# Patient Record
Sex: MALE | Race: WHITE | NOT HISPANIC OR LATINO | Employment: FULL TIME | URBAN - METROPOLITAN AREA
[De-identification: names, ages, dates, MRNs, and addresses within clinical notes are randomized per-mention and may not be internally consistent; named-entity substitution may affect disease eponyms.]

---

## 2017-04-05 ENCOUNTER — OFFICE VISIT (OUTPATIENT)
Dept: SURGERY | Facility: CLINIC | Age: 52
End: 2017-04-05

## 2017-04-05 VITALS — HEIGHT: 72 IN | HEART RATE: 87 BPM | BODY MASS INDEX: 38.6 KG/M2 | OXYGEN SATURATION: 98 % | WEIGHT: 285 LBS

## 2017-04-05 DIAGNOSIS — R10.32 GROIN PAIN, LEFT: Primary | ICD-10-CM

## 2017-04-05 PROCEDURE — 99203 OFFICE O/P NEW LOW 30 MIN: CPT | Performed by: SURGERY

## 2017-04-05 NOTE — PROGRESS NOTES
Date: 2017   Patient Name: Zohaib Duran   Medical Record #: [id number]   : 1965  Age: 51 y.o.  Sex: male      Referring MD:  No referring provider defined for this encounter.    Reason for Visit  Chief Complaint   Patient presents with   • Inguinal Hernia     Left         History of Present Illness:     Zohaib Duran is a 51 y.o. male who presents with left groin pain.  The patient complains of left groin pain that started approximately 1 week ago. Cannot recognize a initiating factor. This was all of the sudden has been constant since then with periods of exacerbation and improvement.  The pain can be dull, sharp, burning and does not have specific quality.  The intensity is mild to moderate and he has been able to do same activities.  He was doing before without taking any pain medication.  The pain is located over the left groin and radiates to the medial aspect of the left thigh and the left testicle.  Physical activity tends to make the pain worse and laying in a recliner makes pain better.  He denies feeling any masses or lumps in the left groin.  He denies pain on the right side.  He had prior open left inguinal hernia repair with mesh by Dr. Bloom in .  He denies any nausea and vomiting, there is no obstructive symptoms.     Medical History  There is no problem list on file for this patient.      Past Surgical History  Past Surgical History:   Procedure Laterality Date   • COLONOSCOPY      hemorrhoids   • HERNIA REPAIR Left 10/27/2010    Open left inguinal hernia repair w/ mesh-Dr. Fito Bloom   • TUMOR REMOVAL Left     thigh       Allergies  Allergies   Allergen Reactions   • Sulfa Antibiotics Rash and Swelling       Medications  No current outpatient prescriptions on file.     No current facility-administered medications for this visit.        Social History  Social History     Social History   • Marital status:      Spouse name: N/A   • Number of children: N/A  "  • Years of education: N/A     Social History Main Topics   • Smoking status: Former Smoker   • Smokeless tobacco: None   • Alcohol use Yes      Comment: occasionally   • Drug use: No   • Sexual activity: Yes     Partners: Female     Birth control/ protection: Surgical     Other Topics Concern   • None     Social History Narrative       Family History  Family History   Problem Relation Age of Onset   • Glaucoma Mother    • Kidney disease Other      STONES       REVIEW OF SYSTEMS  CONSTITUTIONAL: Denies fevers, chills, unintentional weight loss or weight gain  RESPIRATORY: Denies chronic cough or sob.   CARDIAC: Denies chest pain, palpitations, edema  GI: Denies dyspepsia, reflux, heartburn, nausea, vomiting, diarrhea or constipation  : Denies dysuria or hematuria.    MUSCULOSKELETAL: Denies muscle weakness and pain   NEURO: Denies chronic headaches.   ENDOCRINE: Denies significant heat or cold intolerance or history of thyroid problems.    DERM: no rashes,lesions or discharge.     Physical Exam  Pulse 87  Ht 72\" (182.9 cm)  Wt 285 lb (129 kg)  SpO2 98%  BMI 38.65 kg/m2  Body mass index is 38.65 kg/(m^2).  General: Alert and oriented, NAD  HEENT: EOMI, conjunctiva clear, moist mucus membranes  Lungs: clear to auscultation and percussion, no chest deformities noted.  Cardiovascular:  Regular rate and rhythm  Extremities: Without clubbing cyanosis or edema  Musculoskeletal: Without acute abnormality, good ROM.   Skin:  No rashes. Warm and moist.  Neuro: Gait normal. Sensation and strength grossly normal.  Abdominal exam: Abdomen is soft and nontender, is nondistended, bowel sounds are positive.  There is no evidence of right inguinal hernia.  The inguinal floor is strong.  Over the left inguinal canal there is tenderness to palpation.  I do not feel an inguinal hernia.  He has weakness of the inguinal canal but no evidence of herniation.     Medical Decision Making  Test Results:    Results for orders placed or " performed during the hospital encounter of 12/04/16   POCT rapid strep A   Result Value Ref Range    Rapid Strep A Screen Positive (A) Negative, VALID, INVALID, Not Performed    Internal Control Passed Passed    Lot Number [id number]     Expiration Date 1/18        Impression:  This is a 51 y.o. male patient with a chief complaint of left groin pain that started a week ago.  On physical exam I do not feel an inguinal hernia but some weakness of the left inguinal floor.  His physical exam is limited by his body habitus.  I discussed with him about my findings.  It doesn't seem to me that he has a recurrence of his inguinal hernia but he has been having constant pain the need to be follow-up. He does not have any obstructive symptoms or concerning signs.      Plan:  -  I recommend that he does limited physical activity for the next 2 weeks   - Take ibuprofen and Tylenol daily for pain   - If they persist or doesn't improve within the next 2 weeks I will order a CT scan of the groin to rule out small inguinal hernia recurrence or any other causes of groin pain.   - Emergency signs like severe groin pain that doesn't improve, vomiting, abdominal distention fevers were discussed with him and he should come to emergency room or call our office if this happened .    - The patient verbalized understanding and agreed with the plan     Mulugeta Ochoa MD  General, Minimally Invasive and Endoscopic Surgery  [facility]    [facility], [address]  [place], [place], [address]  P: [phone]  F: [phone]

## 2017-06-20 ENCOUNTER — OFFICE VISIT (OUTPATIENT)
Dept: FAMILY MEDICINE CLINIC | Facility: CLINIC | Age: 52
End: 2017-06-20

## 2017-06-20 VITALS
BODY MASS INDEX: 36.06 KG/M2 | OXYGEN SATURATION: 97 % | HEART RATE: 100 BPM | TEMPERATURE: 98.7 F | DIASTOLIC BLOOD PRESSURE: 64 MMHG | SYSTOLIC BLOOD PRESSURE: 100 MMHG | HEIGHT: 72 IN | WEIGHT: 266.2 LBS

## 2017-06-20 DIAGNOSIS — L03.119 RECURRENT CELLULITIS OF LOWER EXTREMITY: Primary | ICD-10-CM

## 2017-06-20 PROBLEM — R53.83 FATIGUE: Status: ACTIVE | Noted: 2017-06-20

## 2017-06-20 PROBLEM — G47.33 OBSTRUCTIVE SLEEP APNEA: Status: ACTIVE | Noted: 2017-06-20

## 2017-06-20 PROBLEM — M54.16 LUMBAR RADICULOPATHY: Status: ACTIVE | Noted: 2017-06-20

## 2017-06-20 PROBLEM — M70.70 BURSITIS OF HIP: Status: ACTIVE | Noted: 2017-06-20

## 2017-06-20 PROBLEM — E66.01 MORBID OBESITY (HCC): Status: ACTIVE | Noted: 2017-06-20

## 2017-06-20 PROCEDURE — 99213 OFFICE O/P EST LOW 20 MIN: CPT | Performed by: PHYSICIAN ASSISTANT

## 2017-06-20 RX ORDER — CLINDAMYCIN HYDROCHLORIDE 300 MG/1
CAPSULE ORAL
Refills: 0 | COMMUNITY
Start: 2017-06-11 | End: 2017-07-17

## 2017-06-20 RX ORDER — DOXYCYCLINE 100 MG/1
100 TABLET ORAL 2 TIMES DAILY
Qty: 20 TABLET | Refills: 0 | Status: SHIPPED | OUTPATIENT
Start: 2017-06-20 | End: 2017-07-17

## 2017-06-20 NOTE — PROGRESS NOTES
Subjective   Zohaib Duran is a 51 y.o. male here today follow-up [facility] ER for cellulitis left lower leg on June 11    History of Present Illness     DID HAVE SLEEP STUDY- HAS CPAP.   DID NOT HAVE COLONOSCOPY. NO FHX COLONOSCOPY. HAD COLONOSCOPY YEARS AGO FOR RECTAL BLEEDING- ADVISED WAS FROM HEMORRHOIDS.     HAS HAD CELLULITIS 3-4 X. HAD SAME TIME LAST YEAR AND WENT TO ER. HAD A COUPLE TIMES PRIOR.   PATIENT HAS MULTIPLE JOINTS ACHES AND PAINS- HAD INJECTION WITH ORTHOPEDIST, HOWEVER, WAS PAINFUL AND DIDN'T HELP FOR LONG.     NO OTHER SIGNIFICANT MEDICAL ISSUES.   NO VASCULAR TESTING IN THE PAST.     The following portions of the patient's history were reviewed and updated as appropriate: allergies, current medications, past family history, past medical history, past social history, past surgical history and problem list.    Review of Systems   Skin:        Cellulitis left lower leg       Objective   Physical Exam   Constitutional: He is oriented to person, place, and time. He appears well-developed.   HENT:   Head: Normocephalic and atraumatic.   Right Ear: External ear normal.   Left Ear: External ear normal.   Eyes: Conjunctivae are normal.   Neck: Carotid bruit is not present. No tracheal deviation present. No thyroid mass and no thyromegaly present.   Cardiovascular: Normal rate, regular rhythm and normal heart sounds.    Pulses:       Radial pulses are 2+ on the right side, and 2+ on the left side.        Dorsalis pedis pulses are 2+ on the right side, and 2+ on the left side.        Posterior tibial pulses are 2+ on the right side, and 1+ on the left side.   Pulmonary/Chest: Effort normal and breath sounds normal.   Neurological: He is alert and oriented to person, place, and time. Gait normal.   Skin: Skin is warm and dry.   Psychiatric: He has a normal mood and affect. His behavior is normal. Judgment and thought content normal.   Nursing note and vitals reviewed.      Assessment/Plan   Zohaib was seen  today for follow [facility] from june 11 for cellulitis left lower leg.    Diagnoses and all orders for this visit:    Recurrent cellulitis of lower extremity  -     Doppler Ankle Brachial Index Single Level CAR  -     Duplex Venous Lower Extremity - Left CAR  -     doxycycline (ADOXA) 100 MG tablet; Take 1 tablet by mouth 2 (Two) Times a Day.      Patient Instructions   51 YEAR OLD MALE WHO PRESENTS TODAY IN FOLLOW UP OF CELLULITIS. PATIENT HAS HAD MULTIPLE EPISODES OF CELLULITIS. THERE HAVE BEEN NO EVENTS OR INJURIES LEADING TO CELLULITIS. PATIENT HAS HAD IMPROVEMENT BUT NO RESOLUTION OF SYMPTOMS. PATIENT TO TAKE DOXYCYCLINE 100 MG TWICE DAILY FOR 14 DAYS. TO BE SEEN ASAP IF WORSENING OR NO RESOLUTION. I WILL ALSO REFER FOR VENOUS DUPLEX AND WESLEY. PATIENT TO RETURN TO OFFICE FOR CPE.

## 2017-06-27 NOTE — PATIENT INSTRUCTIONS
51 YEAR OLD MALE WHO PRESENTS TODAY IN FOLLOW UP OF CELLULITIS. PATIENT HAS HAD MULTIPLE EPISODES OF CELLULITIS. THERE HAVE BEEN NO EVENTS OR INJURIES LEADING TO CELLULITIS. PATIENT HAS HAD IMPROVEMENT BUT NO RESOLUTION OF SYMPTOMS. PATIENT TO TAKE DOXYCYCLINE 100 MG TWICE DAILY FOR 14 DAYS. TO BE SEEN ASAP IF WORSENING OR NO RESOLUTION. I WILL ALSO REFER FOR VENOUS DUPLEX AND WESLEY. PATIENT TO RETURN TO OFFICE FOR CPE.

## 2017-07-12 ENCOUNTER — HOSPITAL ENCOUNTER (OUTPATIENT)
Dept: CARDIOLOGY | Facility: HOSPITAL | Age: 52
Discharge: HOME | End: 2017-07-12
Admitting: PHYSICIAN ASSISTANT

## 2017-07-12 ENCOUNTER — HOSPITAL ENCOUNTER (OUTPATIENT)
Dept: CARDIOLOGY | Facility: HOSPITAL | Age: 52
Discharge: HOME | End: 2017-07-12

## 2017-07-12 LAB
BH CV LOWER ARTERIAL LEFT ABI RATIO: 1
BH CV LOWER ARTERIAL LEFT DORSALIS PEDIS SYS MAX: 116 MMHG
BH CV LOWER ARTERIAL LEFT GREAT TOE SYS MAX: 101 MMHG
BH CV LOWER ARTERIAL LEFT POST TIBIAL SYS MAX: 129 MMHG
BH CV LOWER ARTERIAL LEFT TBI RATIO: 0.82
BH CV LOWER ARTERIAL RIGHT ABI RATIO: 1.1
BH CV LOWER ARTERIAL RIGHT DORSALIS PEDIS SYS MAX: 142 MMHG
BH CV LOWER ARTERIAL RIGHT GREAT TOE SYS MAX: 112 MMHG
BH CV LOWER ARTERIAL RIGHT POST TIBIAL SYS MAX: 127 MMHG
BH CV LOWER ARTERIAL RIGHT TBI RATIO: 0.91
BH CV LOWER VASCULAR LEFT COMMON FEMORAL AUGMENT: NORMAL
BH CV LOWER VASCULAR LEFT COMMON FEMORAL COMPETENT: NORMAL
BH CV LOWER VASCULAR LEFT COMMON FEMORAL COMPRESS: NORMAL
BH CV LOWER VASCULAR LEFT COMMON FEMORAL PHASIC: NORMAL
BH CV LOWER VASCULAR LEFT COMMON FEMORAL SPONT: NORMAL
BH CV LOWER VASCULAR LEFT DISTAL FEMORAL COMPRESS: NORMAL
BH CV LOWER VASCULAR LEFT GASTRONEMIUS COMPRESS: NORMAL
BH CV LOWER VASCULAR LEFT GREATER SAPH AK COMPRESS: NORMAL
BH CV LOWER VASCULAR LEFT GREATER SAPH BK COMPRESS: NORMAL
BH CV LOWER VASCULAR LEFT MID FEMORAL AUGMENT: NORMAL
BH CV LOWER VASCULAR LEFT MID FEMORAL COMPETENT: NORMAL
BH CV LOWER VASCULAR LEFT MID FEMORAL COMPRESS: NORMAL
BH CV LOWER VASCULAR LEFT MID FEMORAL PHASIC: NORMAL
BH CV LOWER VASCULAR LEFT MID FEMORAL SPONT: NORMAL
BH CV LOWER VASCULAR LEFT PERONEAL COMPRESS: NORMAL
BH CV LOWER VASCULAR LEFT POPLITEAL AUGMENT: NORMAL
BH CV LOWER VASCULAR LEFT POPLITEAL COMPETENT: NORMAL
BH CV LOWER VASCULAR LEFT POPLITEAL COMPRESS: NORMAL
BH CV LOWER VASCULAR LEFT POPLITEAL PHASIC: NORMAL
BH CV LOWER VASCULAR LEFT POPLITEAL SPONT: NORMAL
BH CV LOWER VASCULAR LEFT POSTERIOR TIBIAL COMPRESS: NORMAL
BH CV LOWER VASCULAR LEFT PROXIMAL FEMORAL COMPRESS: NORMAL
BH CV LOWER VASCULAR LEFT SAPHENOFEMORAL JUNCTION AUGMENT: NORMAL
BH CV LOWER VASCULAR LEFT SAPHENOFEMORAL JUNCTION COMPETENT: NORMAL
BH CV LOWER VASCULAR LEFT SAPHENOFEMORAL JUNCTION COMPRESS: NORMAL
BH CV LOWER VASCULAR LEFT SAPHENOFEMORAL JUNCTION PHASIC: NORMAL
BH CV LOWER VASCULAR LEFT SAPHENOFEMORAL JUNCTION SPONT: NORMAL
BH CV LOWER VASCULAR RIGHT COMMON FEMORAL AUGMENT: NORMAL
BH CV LOWER VASCULAR RIGHT COMMON FEMORAL COMPETENT: NORMAL
BH CV LOWER VASCULAR RIGHT COMMON FEMORAL COMPRESS: NORMAL
BH CV LOWER VASCULAR RIGHT COMMON FEMORAL PHASIC: NORMAL
BH CV LOWER VASCULAR RIGHT COMMON FEMORAL SPONT: NORMAL
UPPER ARTERIAL LEFT ARM BRACHIAL SYS MAX: 123 MMHG
UPPER ARTERIAL RIGHT ARM BRACHIAL SYS MAX: 120 MMHG

## 2017-07-12 PROCEDURE — 93971 EXTREMITY STUDY: CPT

## 2017-07-12 PROCEDURE — 93922 UPR/L XTREMITY ART 2 LEVELS: CPT

## 2017-07-17 ENCOUNTER — OFFICE VISIT (OUTPATIENT)
Dept: FAMILY MEDICINE CLINIC | Facility: CLINIC | Age: 52
End: 2017-07-17

## 2017-07-17 VITALS
OXYGEN SATURATION: 98 % | WEIGHT: 272.6 LBS | HEART RATE: 96 BPM | DIASTOLIC BLOOD PRESSURE: 80 MMHG | HEIGHT: 72 IN | BODY MASS INDEX: 36.92 KG/M2 | TEMPERATURE: 98.7 F | SYSTOLIC BLOOD PRESSURE: 132 MMHG

## 2017-07-17 DIAGNOSIS — I44.0 1ST DEGREE AV BLOCK: ICD-10-CM

## 2017-07-17 DIAGNOSIS — Z00.00 ROUTINE ADULT HEALTH MAINTENANCE: Primary | ICD-10-CM

## 2017-07-17 DIAGNOSIS — L03.119 RECURRENT CELLULITIS OF LOWER EXTREMITY: ICD-10-CM

## 2017-07-17 LAB
DEVELOPER EXPIRATION DATE: NORMAL
DEVELOPER LOT NUMBER: NORMAL
EXPIRATION DATE: NORMAL
FECAL OCCULT BLOOD SCREEN, POC: NEGATIVE
Lab: NORMAL
NEGATIVE CONTROL: NEGATIVE
POSITIVE CONTROL: POSITIVE

## 2017-07-17 PROCEDURE — 99396 PREV VISIT EST AGE 40-64: CPT | Performed by: PHYSICIAN ASSISTANT

## 2017-07-17 PROCEDURE — 82274 ASSAY TEST FOR BLOOD FECAL: CPT | Performed by: PHYSICIAN ASSISTANT

## 2017-07-17 PROCEDURE — 93000 ELECTROCARDIOGRAM COMPLETE: CPT | Performed by: PHYSICIAN ASSISTANT

## 2017-07-17 NOTE — PATIENT INSTRUCTIONS
51 YEAR OLD MALE WHO PRESENTS TODAY FOR CPE. CPE PERFORMED TODAY. EKG IS ABNORMAL TODAY WITH 1ST DEGREE AV BLOCK- I WILL REFER TO CARDIOLOGY FOR EVALUATION. PATIENT TO HAVE LABS WHEN FASTING. CALL IF NO RESULTS IN 1 WEEK. FURTHER RECOMMENDATIONS AND FOLLOW UP PENDING LABS. PATIENT WITH RECURRENT CELLULITIS. HE DOES HAVE PMH SIGNIFICANT FOR LEFT LEG SURGERY FOR MASS EXCISION. CELLULITIS HAS BEEN BILATERAL, HOWEVER. NORMAL WESLEY AND VENOUS DOPPLER. I DISCUSSED COMPRESSION STOCKINGS VS VASCULAR SURGERY EVALUATION. HE WOULD LIKE TO TRY COMPRESSION STOCKINGS 1ST AND IF UNABLE TO BE COMPLIANT WITH THIS OR IF RECURRENCE OF CELLULITIS, I WILL REFER TO VASCULAR.

## 2017-07-17 NOTE — PROGRESS NOTES
Subjective   Zohaib Duran is a 51 y.o. male seen today for physical exam    History of Present Illness     STATES HAS BEEN SEEN BY CARDIOLOGIST IN THE PAST. NOT SURE THE NAME OR WHEN WAS SEEN.     CELLULITIS- HAS HAD BILATERALLY. DOES NOT THINK IS MORE FREQUENT IN LEFT THAN RIGHT. STATES HAD TUMOR REMOVAL LEFT LEG.     NO NEW CONCERNS.     NO COLONOSCOPY WHEN ORDERED BUT HAD 1 IN THE REMOTE PAST. NO FHX COLON CANCER.   LAST TDAP- 9/24/15    The following portions of the patient's history were reviewed and updated as appropriate: allergies, current medications, past family history, past medical history, past social history, past surgical history and problem list.    Review of Systems   All other systems reviewed and are negative.      Objective   Physical Exam   Constitutional: He is oriented to person, place, and time. He appears well-developed and well-nourished. No distress.   HENT:   Head: Normocephalic and atraumatic.   Right Ear: Hearing, tympanic membrane, external ear and ear canal normal.   Left Ear: Hearing, tympanic membrane, external ear and ear canal normal.   Nose: Nose normal.   Mouth/Throat: Oropharynx is clear and moist.   Eyes: Conjunctivae, EOM and lids are normal. Pupils are equal, round, and reactive to light.   Neck: Neck supple. No JVD present. Carotid bruit is not present. No tracheal deviation present. No thyroid mass and no thyromegaly present.   Cardiovascular: Normal rate, regular rhythm, normal heart sounds and intact distal pulses.  Exam reveals no gallop and no friction rub.    No murmur heard.  Pulses:       Radial pulses are 2+ on the right side, and 2+ on the left side.        Posterior tibial pulses are 2+ on the right side, and 2+ on the left side.   Pulmonary/Chest: Effort normal and breath sounds normal. No respiratory distress. He has no wheezes. He has no rhonchi. He has no rales.   Abdominal: Soft. Normal aorta and bowel sounds are normal. He exhibits no distension and no  abdominal bruit. There is no hepatosplenomegaly. There is no tenderness. There is no rigidity, no rebound and no guarding. No hernia. Hernia confirmed negative in the right inguinal area and confirmed negative in the left inguinal area.   Genitourinary: Rectum normal, testes normal and penis normal. Prostate is not enlarged and not tender.   Musculoskeletal: Normal range of motion. He exhibits edema (TRACE EDEMA WITH SLIGHT HYPERPIGMENTATION AND HAIR LOSS AT DISTAL LE. ). He exhibits no tenderness or deformity.   Normal strength.   Lymphadenopathy:     He has no cervical adenopathy. No inguinal adenopathy noted on the right or left side.   Neurological: He is alert and oriented to person, place, and time. He has normal strength and normal reflexes. He displays normal reflexes. No cranial nerve deficit or sensory deficit. He exhibits normal muscle tone. Coordination and gait normal.   Skin: Skin is warm and dry. No rash noted. He is not diaphoretic. No erythema.   Psychiatric: He has a normal mood and affect. His speech is normal and behavior is normal. Judgment and thought content normal. Cognition and memory are normal.     ECG 12 Lead  Date/Time: 7/17/2017 12:19 PM  Performed by: VICKY BUTLER  Authorized by: VICKY BUTLER   Comparison: compared with previous ECG from 9/24/2015  Comparison to previous ECG: NEW CHANGES- 1ST DEGREE AV BLOCK= WAS BORDERLINE PROLONGED 9/2015 BUT MORE PROLONGED TODAY. NEW SUPRAVENTRICULAR PREMATURE COMPLEX.   Rhythm: sinus rhythm  Rate: normal  Conduction: 1st degree  ST segment elevation noted on lead: NS ST CHANGES. NO CHANGE FROM 2015.   QRS axis: normal  Other findings comments: PREMATURE SUPRAVENTRICULAR COMPLEX  Clinical impression: abnormal ECG  Comments: INDICATION: CPE. REFER TO CARDIOLOGY WITH CHANGE IN EKG.           Assessment/Plan   Zohaib was seen today for annual exam and swelling in left lower leg.    Diagnoses and all orders for this visit:    Routine adult health  maintenance  -     ECG 12 Lead  -     CBC & Differential  -     Comprehensive Metabolic Panel  -     Lipid Panel With LDL / HDL Ratio  -     Thyroid Panel With TSH  -     PSA  -     Vitamin D 25 Hydroxy  -     Urinalysis With Microscopic  -     POC Occult Blood Stool    1St degree AV block  -     Ambulatory Referral to Cardiology    Recurrent cellulitis of lower extremity      Patient Instructions   51 YEAR OLD MALE WHO PRESENTS TODAY FOR CPE. CPE PERFORMED TODAY. EKG IS ABNORMAL TODAY WITH 1ST DEGREE AV BLOCK- I WILL REFER TO CARDIOLOGY FOR EVALUATION. PATIENT TO HAVE LABS WHEN FASTING. CALL IF NO RESULTS IN 1 WEEK. FURTHER RECOMMENDATIONS AND FOLLOW UP PENDING LABS. PATIENT WITH RECURRENT CELLULITIS. HE DOES HAVE PMH SIGNIFICANT FOR LEFT LEG SURGERY FOR MASS EXCISION. CELLULITIS HAS BEEN BILATERAL, HOWEVER. NORMAL WESLEY AND VENOUS DOPPLER. I DISCUSSED COMPRESSION STOCKINGS VS VASCULAR SURGERY EVALUATION. HE WOULD LIKE TO TRY COMPRESSION STOCKINGS 1ST AND IF UNABLE TO BE COMPLIANT WITH THIS OR IF RECURRENCE OF CELLULITIS, I WILL REFER TO VASCULAR.

## 2017-08-18 ENCOUNTER — TELEPHONE (OUTPATIENT)
Dept: FAMILY MEDICINE CLINIC | Facility: CLINIC | Age: 52
End: 2017-08-18

## 2017-11-29 ENCOUNTER — OFFICE VISIT (OUTPATIENT)
Dept: FAMILY MEDICINE CLINIC | Facility: CLINIC | Age: 52
End: 2017-11-29

## 2017-11-29 VITALS
HEART RATE: 85 BPM | HEIGHT: 72 IN | OXYGEN SATURATION: 96 % | SYSTOLIC BLOOD PRESSURE: 120 MMHG | DIASTOLIC BLOOD PRESSURE: 70 MMHG | TEMPERATURE: 98.2 F | BODY MASS INDEX: 37.93 KG/M2 | WEIGHT: 280 LBS

## 2017-11-29 DIAGNOSIS — E55.9 VITAMIN D DEFICIENCY: ICD-10-CM

## 2017-11-29 DIAGNOSIS — N52.9 ERECTILE DYSFUNCTION, UNSPECIFIED ERECTILE DYSFUNCTION TYPE: ICD-10-CM

## 2017-11-29 DIAGNOSIS — G47.33 OBSTRUCTIVE SLEEP APNEA: Primary | ICD-10-CM

## 2017-11-29 PROCEDURE — 99213 OFFICE O/P EST LOW 20 MIN: CPT | Performed by: PHYSICIAN ASSISTANT

## 2017-12-01 LAB
25(OH)D3+25(OH)D2 SERPL-MCNC: 15 NG/ML (ref 30–100)
ALBUMIN SERPL-MCNC: 4.5 G/DL (ref 3.5–5.5)
ALBUMIN/GLOB SERPL: 1.7 {RATIO} (ref 1.2–2.2)
ALP SERPL-CCNC: 57 IU/L (ref 39–117)
ALT SERPL-CCNC: 24 IU/L (ref 0–44)
AST SERPL-CCNC: 12 IU/L (ref 0–40)
BASOPHILS # BLD AUTO: 0 X10E3/UL (ref 0–0.2)
BASOPHILS NFR BLD AUTO: 1 %
BILIRUB SERPL-MCNC: 1.1 MG/DL (ref 0–1.2)
BUN SERPL-MCNC: 13 MG/DL (ref 6–24)
BUN/CREAT SERPL: 13 (ref 9–20)
CALCIUM SERPL-MCNC: 9.1 MG/DL (ref 8.7–10.2)
CHLORIDE SERPL-SCNC: 101 MMOL/L (ref 96–106)
CHOLEST SERPL-MCNC: 180 MG/DL (ref 100–199)
CO2 SERPL-SCNC: 23 MMOL/L (ref 18–29)
CREAT SERPL-MCNC: 0.97 MG/DL (ref 0.76–1.27)
EOSINOPHIL # BLD AUTO: 0.2 X10E3/UL (ref 0–0.4)
EOSINOPHIL NFR BLD AUTO: 3 %
ERYTHROCYTE [DISTWIDTH] IN BLOOD BY AUTOMATED COUNT: 13.5 % (ref 12.3–15.4)
FT4I SERPL CALC-MCNC: 1.3 (ref 1.2–4.9)
GFR SERPLBLD CREATININE-BSD FMLA CKD-EPI: 106 ML/MIN/1.73
GFR SERPLBLD CREATININE-BSD FMLA CKD-EPI: 91 ML/MIN/1.73
GLOBULIN SER CALC-MCNC: 2.7 G/DL (ref 1.5–4.5)
GLUCOSE SERPL-MCNC: 95 MG/DL (ref 65–99)
GLUCOSE UR QL: (no result)
HCT VFR BLD AUTO: 43.2 % (ref 37.5–51)
HDLC SERPL-MCNC: 34 MG/DL
HGB BLD-MCNC: 15.2 G/DL (ref 12.6–17.7)
IMM GRANULOCYTES # BLD: 0 X10E3/UL (ref 0–0.1)
IMM GRANULOCYTES NFR BLD: 0 %
KETONES UR QL STRIP: (no result)
LDLC SERPL CALC-MCNC: 122 MG/DL (ref 0–99)
LDLC/HDLC SERPL: 3.6 RATIO UNITS (ref 0–3.6)
LYMPHOCYTES # BLD AUTO: 1.6 X10E3/UL (ref 0.7–3.1)
LYMPHOCYTES NFR BLD AUTO: 24 %
MCH RBC QN AUTO: 32.4 PG (ref 26.6–33)
MCHC RBC AUTO-ENTMCNC: 35.2 G/DL (ref 31.5–35.7)
MCV RBC AUTO: 92 FL (ref 79–97)
MONOCYTES # BLD AUTO: 0.5 X10E3/UL (ref 0.1–0.9)
MONOCYTES NFR BLD AUTO: 7 %
NEUTROPHILS # BLD AUTO: 4.5 X10E3/UL (ref 1.4–7)
NEUTROPHILS NFR BLD AUTO: 65 %
PH UR STRIP: (no result) [PH]
PLATELET # BLD AUTO: 184 X10E3/UL (ref 150–379)
POTASSIUM SERPL-SCNC: 4.5 MMOL/L (ref 3.5–5.2)
PROT SERPL-MCNC: 7.2 G/DL (ref 6–8.5)
PROT UR QL STRIP: (no result)
PSA SERPL-MCNC: 1.3 NG/ML (ref 0–4)
RBC # BLD AUTO: 4.69 X10E6/UL (ref 4.14–5.8)
REQUEST PROBLEM: NORMAL
SODIUM SERPL-SCNC: 140 MMOL/L (ref 134–144)
SP GR UR: (no result)
T3RU NFR SERPL: 28 % (ref 24–39)
T4 SERPL-MCNC: 4.7 UG/DL (ref 4.5–12)
TRIGL SERPL-MCNC: 121 MG/DL (ref 0–149)
TSH SERPL DL<=0.005 MIU/L-ACNC: 2.51 UIU/ML (ref 0.45–4.5)
VLDLC SERPL CALC-MCNC: 24 MG/DL (ref 5–40)
WBC # BLD AUTO: 6.9 X10E3/UL (ref 3.4–10.8)

## 2017-12-11 NOTE — PATIENT INSTRUCTIONS
52 YEAR OLD MALE WHO PRESENTS TODAY WITH COMPLAINTS OF ERECTILE DYSFUNCTION, TROUBLE MAINTAINING AN ERECTION. NO PROBLEMS OBTAINING AN ERECTION OR WITH EJACULATION. HE REPORTS SIMILAR ISSUES IN THE PAST WHEN HE MET HIS WIFE AND WAS ON VIAGRA A SHORT TIME. HE DID NOT HAVE LABS AS ORDERED 7/2017. PATIENT WILL NEED TO HAVE LABS TO ENSURE NO OTHER PHYSIOLOGICAL ETIOLOGY. ALSO, HE HAS DIONY AND HASN'T SEEN SLEEP MEDICINE FOR 2 YEARS. SHOULD SEE SLEEP MEDICINE IN FOLLOW UP TO ENSURE CORRECT SETTINGS ON MACHINE. HE WILL ALSO NEED TO CONSIDER TRIPLE ARTERIAL SCREEN TO ENSURE NO OTHER VASCULAR ISSUES AND CONSIDERATION OF CORUS CAD TESTING. I WILL PROVIDE SHORT RX FOR VIAGRA, AS PATIENT IS WORKING OUT AND WORKING ON WEIGHT LOSS AND HEALTHIER LIFESTYLE, ONCE LABS HAVE BEEN COMPLETED. WILL NEED ADDITIONAL TESTING IF HE REQUIRES CONTINUED RX.

## 2017-12-15 RX ORDER — SILDENAFIL 50 MG/1
TABLET, FILM COATED ORAL
Qty: 9 TABLET | Refills: 0 | Status: SHIPPED | OUTPATIENT
Start: 2017-12-15 | End: 2018-03-13 | Stop reason: SDUPTHER

## 2017-12-22 ENCOUNTER — TELEPHONE (OUTPATIENT)
Dept: FAMILY MEDICINE CLINIC | Facility: CLINIC | Age: 52
End: 2017-12-22

## 2018-03-05 RX ORDER — SILDENAFIL 50 MG/1
TABLET, FILM COATED ORAL
Qty: 9 TABLET | Refills: 0 | OUTPATIENT
Start: 2018-03-05

## 2018-03-13 ENCOUNTER — OFFICE VISIT (OUTPATIENT)
Dept: FAMILY MEDICINE CLINIC | Facility: CLINIC | Age: 53
End: 2018-03-13

## 2018-03-13 VITALS
HEART RATE: 90 BPM | BODY MASS INDEX: 35.35 KG/M2 | DIASTOLIC BLOOD PRESSURE: 78 MMHG | WEIGHT: 261 LBS | SYSTOLIC BLOOD PRESSURE: 114 MMHG | HEIGHT: 72 IN | TEMPERATURE: 98.8 F | OXYGEN SATURATION: 96 %

## 2018-03-13 DIAGNOSIS — E78.5 DYSLIPIDEMIA: Primary | ICD-10-CM

## 2018-03-13 DIAGNOSIS — E55.9 VITAMIN D DEFICIENCY: ICD-10-CM

## 2018-03-13 PROCEDURE — 99214 OFFICE O/P EST MOD 30 MIN: CPT | Performed by: PHYSICIAN ASSISTANT

## 2018-03-13 RX ORDER — SILDENAFIL 50 MG/1
TABLET, FILM COATED ORAL
Qty: 9 TABLET | Refills: 2 | Status: SHIPPED | OUTPATIENT
Start: 2018-03-13 | End: 2018-11-30 | Stop reason: SDUPTHER

## 2018-03-13 NOTE — PATIENT INSTRUCTIONS
52 YEAR OLD MALE WHO PRESENTS TODAY IN FOLLOW UP OF DIET AND EXERCISE AND ERECTILE DYSFUNCTION. PATIENT HAS DONE VERY WELL WITH DIET AND EXERCISE AND HAS LOST 20 LBS. HE WILL CONTINUE DIET AND EXERCISE AND WEIGHT LOSS. HE DID NOT FOLLOW UP WITH PULMONOLOGY/SLEEP MEDICINE AS ADVISED, BUT HE HAS NOT BEEN USING CPAP FOR ABOUT 4 MONTHS AFTER HITTING HEAD ON ROLL BAR OF HIS JEEP 4 WHEELING. PATIENT REPORTS HE HAD HEADACHES FOR 2 WEEKS AFTER HITTING HEAD. GOT RACING SEATS WITH 5 POINT HARNESS FOR 4 WHEELING NOW TO AVOID FURTHER INJURY. PATIENT DENIES HEADACHES NOW AND HAD NO OTHER NEUROLOGICAL SYMPTOMS. I DISCUSSED TBI AND RISKS AND CONCERNS. HE DOES NOT WANT FURTHER WORKUP AT THIS TIME, AS HE IS FEELING OK. PATIENT REPORTS VIAGRA IS WORKING WELL AND NO AE. HE UNDERSTANDS ALL RISKS OF VIAGRA (CARDIAC CLEARANCE OBTAINED PRIOR TO RX AND HAD WESLEY IN 2017- NORMAL). PATIENT TO RESTART CPAP AND FOLLOW UP WITH SLEEP MEDICINE AS INDICATED BY THEM. HE SHOULD FOLLOW UP WITH ME IN 3 MONTHS FOR RECHECK LIPIDS, VIT D AND FURTHER EVALUATION OF DIET AND EXERCISE.

## 2018-03-13 NOTE — PROGRESS NOTES
Subjective   Zohaib Duran is a 52 y.o. male seen today for follow-up for erectile dysfunction     History of Present Illness     HAS OCULAR 3D LENS AND IS MOVING AROUND AND PUTS WEIGHTS ON ARMS THEN LEGS AND MOVES AROUND. HAS LOST ABOUT 20 LBS WITH DOING THIS. TAKING VITAMINS. EXTRA VITAMIN D. HAS NOT FOLLOWED UP WITH CPAP    WAS 4 WHEELING AND HIT HEAD ON ROLL BAR OF JEEP. HAD HEADACHES ON LEFT SIDE OF HEAD FOR 2 WEEKS AFTER. NO OTHER NEUROLOGICAL SYMPTOMS. STOPPED WEARING CPAP WITH HEAD INJURY- STRAP RUBBED THE PLACE HE HIT HIS HEAD. NO FURTHER PAIN IN HEAD OR HEADACHE. WILL RESTART CPAP.     NO COMPLAINTS. DOING WELL. VIAGRA WORKS WELL. HAD NORMAL WESLEY IN THE RECENT PAST. CARDIOLOGY APPROVED VIAGRA RX.     The following portions of the patient's history were reviewed and updated as appropriate: allergies, current medications, past family history, past medical history, past social history, past surgical history and problem list.    Review of Systems   Genitourinary:        Erectile dysfunction    All other systems reviewed and are negative.      Objective   Physical Exam   Constitutional: He is oriented to person, place, and time. He appears well-developed.   HENT:   Head: Normocephalic and atraumatic.   Right Ear: External ear normal.   Left Ear: External ear normal.   Eyes: Conjunctivae are normal.   Neck: Carotid bruit is not present. No tracheal deviation present. No thyroid mass and no thyromegaly present.   Cardiovascular: Normal rate, regular rhythm, normal heart sounds and intact distal pulses.    Pulmonary/Chest: Effort normal and breath sounds normal.   Neurological: He is alert and oriented to person, place, and time. Gait normal.   Skin: Skin is warm and dry.   Psychiatric: He has a normal mood and affect. His behavior is normal. Judgment and thought content normal.   Nursing note and vitals reviewed.      Assessment/Plan   Zohaib was seen today for follow-up.    Diagnoses and all orders for this  visit:    Dyslipidemia    Vitamin D deficiency    Other orders  -     sildenafil (VIAGRA) 50 MG tablet; TAKE 1 TABLET 1 HOUR PRIOR TO INTERCOURSE. DO NOT EXCEED 1 DOSE IN 24 HOURS.      Patient Instructions   52 YEAR OLD MALE WHO PRESENTS TODAY IN FOLLOW UP OF DIET AND EXERCISE AND ERECTILE DYSFUNCTION. PATIENT HAS DONE VERY WELL WITH DIET AND EXERCISE AND HAS LOST 20 LBS. HE WILL CONTINUE DIET AND EXERCISE AND WEIGHT LOSS. HE DID NOT FOLLOW UP WITH PULMONOLOGY/SLEEP MEDICINE AS ADVISED, BUT HE HAS NOT BEEN USING CPAP FOR ABOUT 4 MONTHS AFTER HITTING HEAD ON ROLL BAR OF HIS JEEP 4 WHEELING. PATIENT REPORTS HE HAD HEADACHES FOR 2 WEEKS AFTER HITTING HEAD. GOT RACING SEATS WITH 5 POINT HARNESS FOR 4 WHEELING NOW TO AVOID FURTHER INJURY. PATIENT DENIES HEADACHES NOW AND HAD NO OTHER NEUROLOGICAL SYMPTOMS. I DISCUSSED TBI AND RISKS AND CONCERNS. HE DOES NOT WANT FURTHER WORKUP AT THIS TIME, AS HE IS FEELING OK. PATIENT REPORTS VIAGRA IS WORKING WELL AND NO AE. HE UNDERSTANDS ALL RISKS OF VIAGRA (CARDIAC CLEARANCE OBTAINED PRIOR TO RX AND HAD WESLEY IN 2017- NORMAL). PATIENT TO RESTART CPAP AND FOLLOW UP WITH SLEEP MEDICINE AS INDICATED BY THEM. HE SHOULD FOLLOW UP WITH ME IN 3 MONTHS FOR RECHECK LIPIDS, VIT D AND FURTHER EVALUATION OF DIET AND EXERCISE.

## 2018-06-13 ENCOUNTER — OFFICE VISIT (OUTPATIENT)
Dept: FAMILY MEDICINE CLINIC | Facility: CLINIC | Age: 53
End: 2018-06-13

## 2018-06-13 VITALS
WEIGHT: 262 LBS | BODY MASS INDEX: 35.49 KG/M2 | HEART RATE: 68 BPM | HEIGHT: 72 IN | OXYGEN SATURATION: 98 % | SYSTOLIC BLOOD PRESSURE: 120 MMHG | TEMPERATURE: 98.5 F | DIASTOLIC BLOOD PRESSURE: 76 MMHG

## 2018-06-13 DIAGNOSIS — E78.5 DYSLIPIDEMIA: Primary | ICD-10-CM

## 2018-06-13 DIAGNOSIS — N52.9 ERECTILE DYSFUNCTION, UNSPECIFIED ERECTILE DYSFUNCTION TYPE: ICD-10-CM

## 2018-06-13 DIAGNOSIS — S06.9X0D TRAUMATIC BRAIN INJURY, WITHOUT LOSS OF CONSCIOUSNESS, SUBSEQUENT ENCOUNTER: ICD-10-CM

## 2018-06-13 DIAGNOSIS — E55.9 VITAMIN D DEFICIENCY: ICD-10-CM

## 2018-06-13 PROBLEM — S06.9XAA TBI (TRAUMATIC BRAIN INJURY) (HCC): Status: ACTIVE | Noted: 2018-06-13

## 2018-06-13 LAB
25(OH)D3+25(OH)D2 SERPL-MCNC: 32.7 NG/ML (ref 30–100)
ALBUMIN SERPL-MCNC: 4.6 G/DL (ref 3.5–5.2)
ALBUMIN/GLOB SERPL: 1.8 G/DL
ALP SERPL-CCNC: 51 U/L (ref 39–117)
ALT SERPL-CCNC: 22 U/L (ref 1–41)
AST SERPL-CCNC: 15 U/L (ref 1–40)
BASOPHILS # BLD AUTO: 0.03 10*3/MM3 (ref 0–0.2)
BASOPHILS NFR BLD AUTO: 0.5 % (ref 0–1.5)
BILIRUB SERPL-MCNC: 0.9 MG/DL (ref 0.1–1.2)
BUN SERPL-MCNC: 12 MG/DL (ref 6–20)
BUN/CREAT SERPL: 11.1 (ref 7–25)
CALCIUM SERPL-MCNC: 9.7 MG/DL (ref 8.6–10.5)
CHLORIDE SERPL-SCNC: 101 MMOL/L (ref 98–107)
CHOLEST SERPL-MCNC: 161 MG/DL (ref 0–200)
CK SERPL-CCNC: 86 U/L (ref 20–200)
CO2 SERPL-SCNC: 27.7 MMOL/L (ref 22–29)
CREAT SERPL-MCNC: 1.08 MG/DL (ref 0.76–1.27)
EOSINOPHIL # BLD AUTO: 0.19 10*3/MM3 (ref 0–0.7)
EOSINOPHIL NFR BLD AUTO: 2.9 % (ref 0.3–6.2)
ERYTHROCYTE [DISTWIDTH] IN BLOOD BY AUTOMATED COUNT: 12.6 % (ref 11.5–14.5)
GFR SERPLBLD CREATININE-BSD FMLA CKD-EPI: 72 ML/MIN/1.73
GFR SERPLBLD CREATININE-BSD FMLA CKD-EPI: 88 ML/MIN/1.73
GLOBULIN SER CALC-MCNC: 2.5 GM/DL
GLUCOSE SERPL-MCNC: 88 MG/DL (ref 65–99)
HCT VFR BLD AUTO: 43.2 % (ref 40.4–52.2)
HDLC SERPL-MCNC: 37 MG/DL (ref 40–60)
HGB BLD-MCNC: 15 G/DL (ref 13.7–17.6)
IMM GRANULOCYTES # BLD: 0.02 10*3/MM3 (ref 0–0.03)
IMM GRANULOCYTES NFR BLD: 0.3 % (ref 0–0.5)
LDLC SERPL CALC-MCNC: 104 MG/DL (ref 0–100)
LDLC/HDLC SERPL: 2.81 {RATIO}
LYMPHOCYTES # BLD AUTO: 1.85 10*3/MM3 (ref 0.9–4.8)
LYMPHOCYTES NFR BLD AUTO: 28.4 % (ref 19.6–45.3)
MCH RBC QN AUTO: 32.6 PG (ref 27–32.7)
MCHC RBC AUTO-ENTMCNC: 34.7 G/DL (ref 32.6–36.4)
MCV RBC AUTO: 93.9 FL (ref 79.8–96.2)
MONOCYTES # BLD AUTO: 0.59 10*3/MM3 (ref 0.2–1.2)
MONOCYTES NFR BLD AUTO: 9 % (ref 5–12)
NEUTROPHILS # BLD AUTO: 3.86 10*3/MM3 (ref 1.9–8.1)
NEUTROPHILS NFR BLD AUTO: 59.2 % (ref 42.7–76)
PLATELET # BLD AUTO: 170 10*3/MM3 (ref 140–500)
POTASSIUM SERPL-SCNC: 4.4 MMOL/L (ref 3.5–5.2)
PROT SERPL-MCNC: 7.1 G/DL (ref 6–8.5)
RBC # BLD AUTO: 4.6 10*6/MM3 (ref 4.6–6)
SODIUM SERPL-SCNC: 141 MMOL/L (ref 136–145)
TRIGL SERPL-MCNC: 101 MG/DL (ref 0–150)
VLDLC SERPL CALC-MCNC: 20.2 MG/DL (ref 5–40)
WBC # BLD AUTO: 6.52 10*3/MM3 (ref 4.5–10.7)

## 2018-06-13 PROCEDURE — 99214 OFFICE O/P EST MOD 30 MIN: CPT | Performed by: PHYSICIAN ASSISTANT

## 2018-06-13 RX ORDER — FAMOTIDINE 20 MG
4000 TABLET ORAL DAILY
COMMUNITY

## 2018-06-13 NOTE — PROGRESS NOTES
Subjective   Zohaib Duran is a 52 y.o. male. Patient seen today for follow-up hyperlipidemia, Vitamin D deficiency     History of Present Illness     REPORTS HE CONTINUES WITH HEADACHES- WORSE ON THE LEFT SINCE HEAD INJURY WHILE 4 WHEELING IN HIS JEEP. HE DECLINED FURTHER WORKUP AT LAST APPT. REPORTS HAS HEADACHES MORE OFTEN ON THE LEFT WHEN HE HAS SINUS PRESSURE OR HEADACHE. NO OTHER NEUROLOGICAL SYMPTOMS. HEADACHES RESOLVE QUICKLY WITH OTC MEDICATION. NO LOC.      NO OTHER SYMPTOMS. HE HAS OTHERWISE BEEN WELL. TOLERATING MEDICATION WITHOUT AE. HAS MAINTAINED WEIGHT LOSS BUT HAS NOT LOST MORE WEIGHT.     The following portions of the patient's history were reviewed and updated as appropriate: allergies, current medications, past family history, past medical history, past social history, past surgical history and problem list.    Review of Systems   All other systems reviewed and are negative.      Objective   Physical Exam   Constitutional: He is oriented to person, place, and time. He appears well-developed.   HENT:   Head: Normocephalic and atraumatic.   Right Ear: External ear normal.   Left Ear: External ear normal.   Eyes: Conjunctivae and EOM are normal. Pupils are equal, round, and reactive to light.   Neck: Carotid bruit is not present. No tracheal deviation present. No thyroid mass and no thyromegaly present.   Cardiovascular: Normal rate, regular rhythm, normal heart sounds and intact distal pulses.    Pulmonary/Chest: Effort normal and breath sounds normal.   Neurological: He is alert and oriented to person, place, and time. He has normal strength. No cranial nerve deficit (GROSSLY INTACT) or sensory deficit. Gait normal.   Skin: Skin is warm and dry.   Psychiatric: He has a normal mood and affect. His behavior is normal. Judgment and thought content normal.   Nursing note and vitals reviewed.      Assessment/Plan   Zohaib was seen today for follow-up.    Diagnoses and all orders for this  visit:    Dyslipidemia  -     CBC & Differential  -     Comprehensive Metabolic Panel  -     CK  -     Lipid Panel With LDL / HDL Ratio    Vitamin D deficiency  -     CBC & Differential  -     Vitamin D 25 Hydroxy    Traumatic brain injury, without loss of consciousness, subsequent encounter  -     MRI Brain Without Contrast  -     CBC & Differential    Erectile dysfunction, unspecified erectile dysfunction type      Patient Instructions   52 YEAR OLD MALE WHO PRESENTS TODAY IN FOLLOW UP OF DYSLIPIDEMIA, VITAMIN D DEFICIENCY, AND ERECTILE DYSFUNCTION. HE HAS MAINTAINED WEIGHT LOSS BUT HAS NOT CONTINUED WITH FURTHER WEIGHT LOSS. I WILL RECHECK LABS TODAY- CALL IF NO RESULTS IN 1 WEEK. IF LABS ARE STABLE, FOLLOW UP IN 6 MONTHS. HE HAS BEEN CLEARED BY CARDIOLOGY FOR VIAGRA AND HAS NO AE TO MEDICATION.     PATIENT WAS 4 WHEELING AND HIT HIS HEAD ON THE ROLL BAR OF HIS JEEP OVER 6 MONTHS AGO. HE HAD HEADACHES ON LEFT SIDE OF HEAD FOR 2 WEEKS AFTER BUT NO OTHER NEUROLOGICAL SYMPTOMS. LAST VISIT, HE COMPLAINED OF NO FURTHER PAIN IN HEAD OR HEADACHE. TODAY, HE REPORTS HAVING MORE FREQUENT HEADACHES ON THE LEFT > RIGHT IF HE HAS SINUS PRESSURE OR OTHER REASON FOR HEADACHE, HE NOTES THAT IT IS ON THE LEFT > RIGHT. IT RESOLVES WITH OTC MEDICATION, HOWEVER, HE NEVER HAD HEADACHES IN THIS DISTRIBUTION IN THE PAST. HE DECLINED FURTHER TESTING AT PREVIOUS VISIT BUT IS WILLING TO HAVE FURTHER WORKUP TODAY, AS HEADACHES HAVE RECURRED. I WILL REFER HIM FOR AN MRI BRAIN WITHOUT CONTRAST. CONSIDERATION OF NEUROLOGY VS PROPHYLACTIC TREATMENT PENDING RESULTS AND ANY PERSISTENT HEADACHE. I HAVE ADVISED THAT HE WEAR A HELMET IF HE CONTINUES 4 WHEELING.

## 2018-06-13 NOTE — PATIENT INSTRUCTIONS
52 YEAR OLD MALE WHO PRESENTS TODAY IN FOLLOW UP OF DYSLIPIDEMIA, VITAMIN D DEFICIENCY, AND ERECTILE DYSFUNCTION. HE HAS MAINTAINED WEIGHT LOSS BUT HAS NOT CONTINUED WITH FURTHER WEIGHT LOSS. I WILL RECHECK LABS TODAY- CALL IF NO RESULTS IN 1 WEEK. IF LABS ARE STABLE, FOLLOW UP IN 6 MONTHS. HE HAS BEEN CLEARED BY CARDIOLOGY FOR VIAGRA AND HAS NO AE TO MEDICATION.     PATIENT WAS 4 WHEELING AND HIT HIS HEAD ON THE ROLL BAR OF HIS JEEP OVER 6 MONTHS AGO. HE HAD HEADACHES ON LEFT SIDE OF HEAD FOR 2 WEEKS AFTER BUT NO OTHER NEUROLOGICAL SYMPTOMS. LAST VISIT, HE COMPLAINED OF NO FURTHER PAIN IN HEAD OR HEADACHE. TODAY, HE REPORTS HAVING MORE FREQUENT HEADACHES ON THE LEFT > RIGHT IF HE HAS SINUS PRESSURE OR OTHER REASON FOR HEADACHE, HE NOTES THAT IT IS ON THE LEFT > RIGHT. IT RESOLVES WITH OTC MEDICATION, HOWEVER, HE NEVER HAD HEADACHES IN THIS DISTRIBUTION IN THE PAST. HE DECLINED FURTHER TESTING AT PREVIOUS VISIT BUT IS WILLING TO HAVE FURTHER WORKUP TODAY, AS HEADACHES HAVE RECURRED. I WILL REFER HIM FOR AN MRI BRAIN WITHOUT CONTRAST. CONSIDERATION OF NEUROLOGY VS PROPHYLACTIC TREATMENT PENDING RESULTS AND ANY PERSISTENT HEADACHE. I HAVE ADVISED THAT HE WEAR A HELMET IF HE CONTINUES 4 WHEELING.

## 2018-06-25 ENCOUNTER — HOSPITAL ENCOUNTER (OUTPATIENT)
Dept: MRI IMAGING | Facility: HOSPITAL | Age: 53
Discharge: HOME | End: 2018-06-25
Admitting: PHYSICIAN ASSISTANT

## 2018-06-25 PROCEDURE — 70551 MRI BRAIN STEM W/O DYE: CPT

## 2018-06-26 RX ORDER — CEFDINIR 300 MG/1
300 CAPSULE ORAL 2 TIMES DAILY
Qty: 20 CAPSULE | Refills: 0 | Status: SHIPPED | OUTPATIENT
Start: 2018-06-26 | End: 2018-11-30

## 2018-11-30 ENCOUNTER — OFFICE VISIT (OUTPATIENT)
Dept: FAMILY MEDICINE CLINIC | Facility: CLINIC | Age: 53
End: 2018-11-30

## 2018-11-30 VITALS
WEIGHT: 241 LBS | BODY MASS INDEX: 32.64 KG/M2 | DIASTOLIC BLOOD PRESSURE: 74 MMHG | SYSTOLIC BLOOD PRESSURE: 116 MMHG | HEIGHT: 72 IN | OXYGEN SATURATION: 99 % | TEMPERATURE: 98.2 F | HEART RATE: 77 BPM

## 2018-11-30 DIAGNOSIS — N52.9 ERECTILE DYSFUNCTION, UNSPECIFIED ERECTILE DYSFUNCTION TYPE: ICD-10-CM

## 2018-11-30 DIAGNOSIS — E55.9 VITAMIN D DEFICIENCY: ICD-10-CM

## 2018-11-30 DIAGNOSIS — Z12.5 PROSTATE CANCER SCREENING: ICD-10-CM

## 2018-11-30 DIAGNOSIS — E78.5 DYSLIPIDEMIA: Primary | ICD-10-CM

## 2018-11-30 PROCEDURE — 99213 OFFICE O/P EST LOW 20 MIN: CPT | Performed by: PHYSICIAN ASSISTANT

## 2018-11-30 RX ORDER — NAFTIFINE HYDROCHLORIDE 2 G/100G
GEL TOPICAL
Refills: 1 | COMMUNITY
Start: 2018-11-23 | End: 2020-08-19

## 2018-11-30 RX ORDER — PREDNISONE 5 MG/1
TABLET, DELAYED RELEASE ORAL
Refills: 0 | COMMUNITY
Start: 2018-11-21 | End: 2020-08-19

## 2018-11-30 RX ORDER — SILDENAFIL 50 MG/1
TABLET, FILM COATED ORAL
Qty: 9 TABLET | Refills: 5 | Status: SHIPPED | OUTPATIENT
Start: 2018-11-30 | End: 2020-08-19 | Stop reason: SDUPTHER

## 2018-12-07 NOTE — PATIENT INSTRUCTIONS
52 YEAR OLD MALE WHO PRESENTS TODAY IN FOLLOW UP OF DYSLIPIDEMIA, VITAMIN D DEFICIENCY, AND ERECTILE DYSFUNCTION. HE HAS CONTINUED WITH FURTHER WEIGHT LOSS- CONTINUE DIET AND EXERCISE. I WILL RECHECK LABS TODAY- CALL IF NO RESULTS IN 1 WEEK. IF LABS ARE STABLE, FOLLOW UP IN 6 MONTHS. HE HAS BEEN CLEARED BY CARDIOLOGY FOR VIAGRA AND HAS NO AE TO MEDICATION. I WILL REFILL MEDICATION TODAY.

## 2020-08-19 ENCOUNTER — OFFICE VISIT (OUTPATIENT)
Dept: FAMILY MEDICINE CLINIC | Facility: CLINIC | Age: 55
End: 2020-08-19

## 2020-08-19 VITALS
WEIGHT: 254 LBS | OXYGEN SATURATION: 98 % | HEART RATE: 85 BPM | TEMPERATURE: 96.8 F | SYSTOLIC BLOOD PRESSURE: 110 MMHG | BODY MASS INDEX: 34.44 KG/M2 | DIASTOLIC BLOOD PRESSURE: 70 MMHG

## 2020-08-19 DIAGNOSIS — N52.9 ERECTILE DYSFUNCTION, UNSPECIFIED ERECTILE DYSFUNCTION TYPE: ICD-10-CM

## 2020-08-19 DIAGNOSIS — E78.5 DYSLIPIDEMIA: ICD-10-CM

## 2020-08-19 DIAGNOSIS — G47.33 OBSTRUCTIVE SLEEP APNEA: ICD-10-CM

## 2020-08-19 DIAGNOSIS — Z00.00 ROUTINE ADULT HEALTH MAINTENANCE: Primary | ICD-10-CM

## 2020-08-19 DIAGNOSIS — Z12.5 PROSTATE CANCER SCREENING: ICD-10-CM

## 2020-08-19 DIAGNOSIS — E55.9 VITAMIN D DEFICIENCY: ICD-10-CM

## 2020-08-19 LAB
25(OH)D3+25(OH)D2 SERPL-MCNC: 22.2 NG/ML (ref 30–100)
ALBUMIN SERPL-MCNC: 4.8 G/DL (ref 3.5–5.2)
ALBUMIN/GLOB SERPL: 2.5 G/DL
ALP SERPL-CCNC: 45 U/L (ref 39–117)
ALT SERPL-CCNC: 17 U/L (ref 1–41)
APPEARANCE UR: CLEAR
AST SERPL-CCNC: 16 U/L (ref 1–40)
BACTERIA #/AREA URNS HPF: ABNORMAL /HPF
BASOPHILS # BLD AUTO: 0.07 10*3/MM3 (ref 0–0.2)
BASOPHILS NFR BLD AUTO: 1 % (ref 0–1.5)
BILIRUB SERPL-MCNC: 0.9 MG/DL (ref 0–1.2)
BILIRUB UR QL STRIP: NEGATIVE
BUN SERPL-MCNC: 9 MG/DL (ref 6–20)
BUN/CREAT SERPL: 10.2 (ref 7–25)
CALCIUM SERPL-MCNC: 8.9 MG/DL (ref 8.6–10.5)
CASTS URNS MICRO: ABNORMAL
CHLORIDE SERPL-SCNC: 102 MMOL/L (ref 98–107)
CHOLEST SERPL-MCNC: 152 MG/DL (ref 0–200)
CO2 SERPL-SCNC: 28.1 MMOL/L (ref 22–29)
COLOR UR: YELLOW
CREAT SERPL-MCNC: 0.88 MG/DL (ref 0.76–1.27)
EOSINOPHIL # BLD AUTO: 0.34 10*3/MM3 (ref 0–0.4)
EOSINOPHIL NFR BLD AUTO: 4.7 % (ref 0.3–6.2)
EPI CELLS #/AREA URNS HPF: ABNORMAL /HPF
ERYTHROCYTE [DISTWIDTH] IN BLOOD BY AUTOMATED COUNT: 12.4 % (ref 12.3–15.4)
GLOBULIN SER CALC-MCNC: 1.9 GM/DL
GLUCOSE SERPL-MCNC: 94 MG/DL (ref 65–99)
GLUCOSE UR QL: NEGATIVE
HCT VFR BLD AUTO: 41.5 % (ref 37.5–51)
HDLC SERPL-MCNC: 36 MG/DL (ref 40–60)
HGB BLD-MCNC: 14.5 G/DL (ref 13–17.7)
HGB UR QL STRIP: NEGATIVE
IMM GRANULOCYTES # BLD AUTO: 0.02 10*3/MM3 (ref 0–0.05)
IMM GRANULOCYTES NFR BLD AUTO: 0.3 % (ref 0–0.5)
KETONES UR QL STRIP: NEGATIVE
LDLC SERPL CALC-MCNC: 95 MG/DL (ref 0–100)
LDLC/HDLC SERPL: 2.63 {RATIO}
LEUKOCYTE ESTERASE UR QL STRIP: NEGATIVE
LYMPHOCYTES # BLD AUTO: 1.61 10*3/MM3 (ref 0.7–3.1)
LYMPHOCYTES NFR BLD AUTO: 22.3 % (ref 19.6–45.3)
MCH RBC QN AUTO: 31.9 PG (ref 26.6–33)
MCHC RBC AUTO-ENTMCNC: 34.9 G/DL (ref 31.5–35.7)
MCV RBC AUTO: 91.4 FL (ref 79–97)
MONOCYTES # BLD AUTO: 0.69 10*3/MM3 (ref 0.1–0.9)
MONOCYTES NFR BLD AUTO: 9.6 % (ref 5–12)
NEUTROPHILS # BLD AUTO: 4.48 10*3/MM3 (ref 1.7–7)
NEUTROPHILS NFR BLD AUTO: 62.1 % (ref 42.7–76)
NITRITE UR QL STRIP: NEGATIVE
NRBC BLD AUTO-RTO: 0 /100 WBC (ref 0–0.2)
PH UR STRIP: 5.5 [PH] (ref 5–8)
PLATELET # BLD AUTO: 183 10*3/MM3 (ref 140–450)
POTASSIUM SERPL-SCNC: 4.2 MMOL/L (ref 3.5–5.2)
PROT SERPL-MCNC: 6.7 G/DL (ref 6–8.5)
PROT UR QL STRIP: NEGATIVE
PSA SERPL-MCNC: 1.53 NG/ML (ref 0–4)
RBC # BLD AUTO: 4.54 10*6/MM3 (ref 4.14–5.8)
RBC #/AREA URNS HPF: ABNORMAL /HPF
SODIUM SERPL-SCNC: 139 MMOL/L (ref 136–145)
SP GR UR: 1.02 (ref 1–1.03)
TRIGL SERPL-MCNC: 106 MG/DL (ref 0–150)
TSH SERPL DL<=0.005 MIU/L-ACNC: 0.97 UIU/ML (ref 0.27–4.2)
UROBILINOGEN UR STRIP-MCNC: NORMAL MG/DL
VLDLC SERPL CALC-MCNC: 21.2 MG/DL
WBC # BLD AUTO: 7.21 10*3/MM3 (ref 3.4–10.8)
WBC #/AREA URNS HPF: ABNORMAL /HPF

## 2020-08-19 PROCEDURE — 99214 OFFICE O/P EST MOD 30 MIN: CPT | Performed by: PHYSICIAN ASSISTANT

## 2020-08-19 PROCEDURE — 99396 PREV VISIT EST AGE 40-64: CPT | Performed by: PHYSICIAN ASSISTANT

## 2020-08-19 RX ORDER — SILDENAFIL 50 MG/1
TABLET, FILM COATED ORAL
Qty: 9 TABLET | Refills: 5 | Status: SHIPPED | OUTPATIENT
Start: 2020-08-19 | End: 2021-10-11

## 2020-08-19 NOTE — PROGRESS NOTES
Subjective   Zohaib Duran is a 54 y.o. male who presents today for CPE.     History of Present Illness     Last colonoscopy- at age 52- 1 polyp- not sure when he follows up. Thinks 3-5 years.   Last Tdap- not sure- may have been > 10 years.   Last flu shot- does not take.     Past Medical History:   Diagnosis Date   • Hip bursitis    • Lumbar radiculopathy    • Sleep apnea     C-Pap compliant     Social History     Socioeconomic History   • Marital status:      Spouse name: Not on file   • Number of children: Not on file   • Years of education: Not on file   • Highest education level: Not on file   Tobacco Use   • Smoking status: Former Smoker   • Smokeless tobacco: Never Used   Substance and Sexual Activity   • Alcohol use: Yes     Comment: occasionally   • Drug use: No   • Sexual activity: Yes     Partners: Female     Birth control/protection: Surgical      Family History   Problem Relation Age of Onset   • Glaucoma Mother    • Kidney disease Other         STONES        The following portions of the patient's history were reviewed and updated as appropriate: allergies, current medications, past family history, past medical history, past social history, past surgical history and problem list.    Review of Systems   Constitutional: Negative.    HENT: Negative.    Eyes: Negative.    Respiratory: Negative.    Cardiovascular: Negative.    Gastrointestinal: Negative.    Genitourinary:        Erectile dysfunction   Musculoskeletal: Negative.    Neurological: Negative.    Psychiatric/Behavioral: Negative.        Objective   Vitals:    08/19/20 0923   BP: 110/70   Pulse: 85   Temp: 96.8 °F (36 °C)   SpO2: 98%     Body mass index is 34.44 kg/m².    Physical Exam   Constitutional: He is oriented to person, place, and time. He appears well-developed and well-nourished. No distress.   HENT:   Head: Normocephalic and atraumatic.   Right Ear: Hearing, tympanic membrane, external ear and ear canal normal.   Left Ear:  Hearing, tympanic membrane, external ear and ear canal normal.   Nose: Nose normal.   Mouth/Throat: Oropharynx is clear and moist.   Eyes: Pupils are equal, round, and reactive to light. Conjunctivae, EOM and lids are normal.   Neck: Neck supple. No JVD present. Carotid bruit is not present. No tracheal deviation present. No thyroid mass and no thyromegaly present.   Cardiovascular: Normal rate, regular rhythm, normal heart sounds and intact distal pulses. Exam reveals no gallop and no friction rub.   No murmur heard.  Pulses:       Radial pulses are 2+ on the right side, and 2+ on the left side.        Posterior tibial pulses are 2+ on the right side, and 2+ on the left side.   Pulmonary/Chest: Effort normal and breath sounds normal. No respiratory distress. He has no wheezes. He has no rhonchi. He has no rales.   Abdominal: Soft. Normal aorta and bowel sounds are normal. He exhibits no distension and no abdominal bruit. There is no hepatosplenomegaly. There is no tenderness. There is no rigidity, no rebound and no guarding. No hernia.   Musculoskeletal: Normal range of motion. He exhibits no edema, tenderness or deformity.   Normal strength.   Lymphadenopathy:     He has no cervical adenopathy.   Neurological: He is alert and oriented to person, place, and time. He has normal strength and normal reflexes. He displays normal reflexes. No cranial nerve deficit or sensory deficit. He exhibits normal muscle tone. Coordination and gait normal.   Skin: Skin is warm and dry. No rash noted. He is not diaphoretic. No erythema.   Psychiatric: He has a normal mood and affect. His speech is normal and behavior is normal. Judgment and thought content normal. Cognition and memory are normal.   Nursing note and vitals reviewed.      Assessment/Plan   Zohaib was seen today for annual exam.    Diagnoses and all orders for this visit:    Routine adult health maintenance    Prostate cancer screening  -     PSA Screen  -      Urinalysis With Microscopic - Urine, Clean Catch    Dyslipidemia  -     CBC & Differential  -     Comprehensive Metabolic Panel  -     Lipid Panel With LDL / HDL Ratio  -     Vitamin D 25 Hydroxy  -     TSH  -     Urinalysis With Microscopic - Urine, Clean Catch    Vitamin D deficiency  -     CBC & Differential  -     Comprehensive Metabolic Panel  -     Lipid Panel With LDL / HDL Ratio  -     Vitamin D 25 Hydroxy  -     TSH  -     Urinalysis With Microscopic - Urine, Clean Catch    Erectile dysfunction, unspecified erectile dysfunction type  -     PSA Screen  -     Urinalysis With Microscopic - Urine, Clean Catch    Obstructive sleep apnea  -     CBC & Differential  -     Ambulatory Referral to Sleep Medicine    Other orders  -     Cancel: Ambulatory Referral For Screening Colonoscopy  -     sildenafil (Viagra) 50 MG tablet; TAKE 1 TABLET 1 HOUR PRIOR TO INTERCOURSE. DO NOT EXCEED 1 DOSE IN 24 HOURS.  -     Microscopic Examination -    Assessment and Plan  54 y.o. male who presents today for CPE and in follow-up of dyslipidemia, vitamin D deficiency, and erectile dysfunction.  CPE performed today. He is up to date on colonoscopy but we will update PSA today. He will also need to contact his insurance for coverage and location of coverage for Tdap as he is overdue. He should have annual flu shot. Patient was counseled at length today about the need for compliance with CPAP and updating health maintenance, becoming more compliant with diet, exercise, and lifestyle choices again, as he did great with this in the past and has slacked off.      Patient worked on diet and exercise and had weight loss and had been feeling well.  He has not been seen since 2018.  He takes no medication for cholesterol.  Patient continues vitamin D 4000 IU once daily.  He has been cleared by cardiology for Viagra and has had no AE with medication. Patient will have fasting labs. Call if no results in 1 week. Stability of conditions, plan,  follow up, and further recommendations pending labs.  If stable, follow-up in 6 months.    He has not been compliant with CPAP- I discussed with him the risks of untreated DIONY and that this could contribute to ED. I will refer back to sleep medicine.

## 2020-08-19 NOTE — PROGRESS NOTES
Subjective   Zohaib Duran is a 54 y.o. male who presents today in follow-up of dyslipidemia, vitamin D deficiency, and erectile dysfunction.      History of Present Illness     Dyslipidemia-patient is on no medication has worked on diet and exercise for significant weight loss.  He had cut out junk food but he has restarted some. He was exercising on elliptical machine- every day for 30 minutes but has stopped.   Vitamin D deficiency-taking vitamin D 4000 IU once daily.    Erectile dysfunction-patient has been cleared by cardiology for Viagra and has taken without AE with medication.    DIONY- not using CPAP- reports he has not used in a long time. Weight was 277 then came here 2018 at 241 lbs. He reports he got down to 230 lbs but has gained some weight.   Plantar fasciitis in left foot- previously seeing podiatry. [facility]. No injections. Had brace for night and is doing stretches in the day. Reports his foot pain resolved an dno problems.     The following portions of the patient's history were reviewed and updated as appropriate: allergies, current medications, past family history, past medical history, past social history, past surgical history and problem list.    Review of Systems   Constitutional: Negative.    HENT: Negative.    Respiratory: Negative.    Cardiovascular: Negative.    Gastrointestinal: Negative.    Genitourinary:        Erectile dysfunction   Musculoskeletal: Negative.    Neurological: Negative.    Psychiatric/Behavioral: Negative.        Objective    Vitals:    08/19/20 0923   BP: 110/70   Pulse: 85   Temp: 96.8 °F (36 °C)   SpO2: 98%     Body mass index is 34.44 kg/m².    Physical Exam   Constitutional: He is oriented to person, place, and time. He appears well-developed.   HENT:   Head: Normocephalic and atraumatic.   Right Ear: External ear normal.   Left Ear: External ear normal.   Eyes: Conjunctivae are normal.   Neck: Carotid bruit is not present. No tracheal deviation  present. No thyroid mass and no thyromegaly present.   Cardiovascular: Normal rate, regular rhythm, normal heart sounds and intact distal pulses.   Pulmonary/Chest: Effort normal and breath sounds normal.   Neurological: He is alert and oriented to person, place, and time. Gait normal.   Skin: Skin is warm and dry.   Psychiatric: He has a normal mood and affect. His behavior is normal. Judgment and thought content normal.   Nursing note and vitals reviewed.      Assessment/Plan   Zohaib was seen today for annual exam.    Diagnoses and all orders for this visit:    Routine adult health maintenance    Prostate cancer screening  -     PSA Screen  -     Urinalysis With Microscopic - Urine, Clean Catch    Dyslipidemia  -     CBC & Differential  -     Comprehensive Metabolic Panel  -     Lipid Panel With LDL / HDL Ratio  -     Vitamin D 25 Hydroxy  -     TSH  -     Urinalysis With Microscopic - Urine, Clean Catch    Vitamin D deficiency  -     CBC & Differential  -     Comprehensive Metabolic Panel  -     Lipid Panel With LDL / HDL Ratio  -     Vitamin D 25 Hydroxy  -     TSH  -     Urinalysis With Microscopic - Urine, Clean Catch    Erectile dysfunction, unspecified erectile dysfunction type  -     PSA Screen  -     Urinalysis With Microscopic - Urine, Clean Catch    Obstructive sleep apnea  -     CBC & Differential  -     Ambulatory Referral to Sleep Medicine    Other orders  -     Cancel: Ambulatory Referral For Screening Colonoscopy  -     sildenafil (Viagra) 50 MG tablet; TAKE 1 TABLET 1 HOUR PRIOR TO INTERCOURSE. DO NOT EXCEED 1 DOSE IN 24 HOURS.  -     Microscopic Examination -    Assessment and plan  54 y.o. male who presents today for CPE and in follow-up of dyslipidemia, vitamin D deficiency, and erectile dysfunction.  CPE performed today. He is up to date on colonoscopy but we will update PSA today. He will also need to contact his insurance for coverage and location of coverage for Tdap as he is overdue. He  should have annual flu shot. Patient was counseled at length today about the need for compliance with CPAP and updating health maintenance, becoming more compliant with diet, exercise, and lifestyle choices again, as he did great with this in the past and has slacked off.      Patient worked on diet and exercise and had weight loss and had been feeling well.  He has not been seen since 2018.  He takes no medication for cholesterol.  Patient continues vitamin D 4000 IU once daily.  He has been cleared by cardiology for Viagra and has had no AE with medication. Patient will have fasting labs. Call if no results in 1 week. Stability of conditions, plan, follow up, and further recommendations pending labs.  If stable, follow-up in 6 months.    He has not been compliant with CPAP- I discussed with him the risks of untreated DIONY and that this could contribute to ED. I will refer back to sleep medicine.

## 2020-08-27 DIAGNOSIS — R31.9 HEMATURIA, UNSPECIFIED TYPE: ICD-10-CM

## 2020-08-27 DIAGNOSIS — N52.9 ERECTILE DYSFUNCTION, UNSPECIFIED ERECTILE DYSFUNCTION TYPE: Primary | ICD-10-CM

## 2020-09-11 ENCOUNTER — APPOINTMENT (OUTPATIENT)
Dept: SLEEP MEDICINE | Facility: HOSPITAL | Age: 55
End: 2020-09-11

## 2020-09-11 ENCOUNTER — OFFICE VISIT (OUTPATIENT)
Dept: SLEEP MEDICINE | Facility: HOSPITAL | Age: 55
End: 2020-09-11

## 2020-09-11 VITALS
SYSTOLIC BLOOD PRESSURE: 159 MMHG | DIASTOLIC BLOOD PRESSURE: 78 MMHG | HEIGHT: 72 IN | BODY MASS INDEX: 32.43 KG/M2 | OXYGEN SATURATION: 98 % | HEART RATE: 81 BPM | WEIGHT: 239.4 LBS

## 2020-09-11 DIAGNOSIS — G47.33 OSA (OBSTRUCTIVE SLEEP APNEA): Primary | ICD-10-CM

## 2020-09-11 DIAGNOSIS — F45.8 GRINDING OF TEETH: ICD-10-CM

## 2020-09-11 DIAGNOSIS — R63.4 WEIGHT LOSS: ICD-10-CM

## 2020-09-11 PROCEDURE — G0463 HOSPITAL OUTPT CLINIC VISIT: HCPCS

## 2020-09-11 NOTE — PROGRESS NOTES
[facility]  Telephone: [phone] / Fax: [phone] [place]  Telephone: [phone] / Fax: [phone] [place] Hearn    Referring Physician: María Orozco PA  PCP: María Orozco PA    Reason for consult:  sleep apnea    Zohaib Duran is a 54 y.o.male  was seen in the Sleep Disorders Center today for re-evaluation. He was tested for DIONY about 5 years ago. Study showed borderline DIONY but he ended up getting a CPAP and used it up until 2 years ago.  When he was using it, snoring went away. He stopped using the machine as he did not have updated CPAP supplies.  Since original sleep study he lost 50 lbs. He has been seeing a dentist for severe teeth grinding. Dentist would like to know if patient needs an oral appliance that can treat DIONY and grinding at the same time. He was therefore sent to us to have an updated study. Repeat study is needed as he lost 50 lbs and DIONY may have resolved.  He reports mild EDS if he works long hours, but no significant snoring after weight loss. He does not wake up gasping or choking. His sleep schedule is 10:30pm-5:30am.     SH- works as , former smoker age [age], drinks 1-2 alc per week, no caffeine.    ROS- +hematuria, +GERD, rest is negative.    Zohaib Duran  has a past medical history of Hip bursitis, Lumbar radiculopathy, and Sleep apnea.    Current Medications:    Current Outpatient Medications:   •  Multiple Vitamin (MULTI VITAMIN DAILY PO), Take  by mouth Daily., Disp: , Rfl:   •  sildenafil (Viagra) 50 MG tablet, TAKE 1 TABLET 1 HOUR PRIOR TO INTERCOURSE. DO NOT EXCEED 1 DOSE IN 24 HOURS., Disp: 9 tablet, Rfl: 5  •  Vitamin D, Cholecalciferol, 1000 units capsule, Take 4,000 Units by mouth Daily., Disp: , Rfl:     I have reviewed Past Medical History, Past Surgical History, Medication List, Social History and Family History as entered in Sleep Questionnaire and EPIC.    ESS  8   Vital Signs /78   Pulse 81   Ht 182.9 cm  "(72\")   Wt 109 kg (239 lb 6.4 oz)   SpO2 98%   BMI 32.47 kg/m²  Body mass index is 32.47 kg/m².    General Alert and oriented. No acute distress noted   Pharynx/Throat Class IV Mallampati airway, large tongue, no evidence of redundant lateral pharyngeal tissue. No oral lesions. No thrush. Moist mucous membranes.   Head Normocephalic. Symmetrical. Atraumatic.    Nose No septal deviation. No drainage   Chest Wall Normal shape. Symmetric expansion with respiration. No tenderness.   Neck Trachea midline, no thyromegaly or adenopathy    Lungs Clear to auscultation bilaterally. No wheezes. No rhonchi. No rales. Respirations regular, even and unlabored.   Heart Regular rhythm and normal rate. Normal S1 and S2. No murmur   Abdomen Soft, non-tender and non-distended. Normal bowel sounds. No masses.   Extremities Moves all extremities well. No edema   Psychiatric Normal mood and affect.          Impression:  1. DIONY (obstructive sleep apnea)    2. Weight loss    3. Grinding of teeth          Plan:  I discussed the pathophysiology of obstructive sleep apnea with the patient.  We discussed the adverse outcomes associated with untreated sleep-disordered breathing.  We discussed treatment modalities of obstructive sleep apnea including CPAP device and oral appliance. Sleep study will be scheduled to see if DIONY is still present despite weight loss. He has narrow airway and a redundant tissue in the posterior oropharynx. However, snoring and sleepiness have improved with weight loss, and he may not require any treatment at all. He will f/u with us after the sleep study.    I appreciate the opportunity to participate in this patient's care.      LUZ Stewart  [facility]  Phone: [phone]      Part of this note may be an electronic transcription/translation of spoken language to printed text using the Dragon Dictation System. Some errors may exist even though the document was edited.      "

## 2020-09-18 ENCOUNTER — TELEPHONE (OUTPATIENT)
Dept: FAMILY MEDICINE CLINIC | Facility: CLINIC | Age: 55
End: 2020-09-18

## 2020-09-18 NOTE — TELEPHONE ENCOUNTER
PT'S WIFE CALLED TO ADVISE PT WENT TO [facility], AND THEY TOLD HIM WILL BENEFIT FROM SOME STEROID DOSE PACK AND MAYBE A MUSCLE RELAXER.    PREFERRED PHARMACY: [facility] [address] [place] [address] [id number] [place] [address] AT [facility] & [facility]  [phone] [facility] [phone] FX

## 2020-10-09 ENCOUNTER — HOSPITAL ENCOUNTER (OUTPATIENT)
Dept: SLEEP MEDICINE | Facility: HOSPITAL | Age: 55
Discharge: HOME | End: 2020-10-09
Admitting: NURSE PRACTITIONER

## 2020-10-09 DIAGNOSIS — G47.33 OSA (OBSTRUCTIVE SLEEP APNEA): ICD-10-CM

## 2020-10-09 DIAGNOSIS — R63.4 WEIGHT LOSS: ICD-10-CM

## 2020-10-09 PROCEDURE — 95806 SLEEP STUDY UNATT&RESP EFFT: CPT

## 2020-10-23 ENCOUNTER — OFFICE VISIT (OUTPATIENT)
Dept: SLEEP MEDICINE | Facility: HOSPITAL | Age: 55
End: 2020-10-23

## 2020-10-23 ENCOUNTER — APPOINTMENT (OUTPATIENT)
Dept: SLEEP MEDICINE | Facility: HOSPITAL | Age: 55
End: 2020-10-23

## 2020-10-23 VITALS
SYSTOLIC BLOOD PRESSURE: 128 MMHG | BODY MASS INDEX: 33.18 KG/M2 | HEIGHT: 72 IN | HEART RATE: 80 BPM | DIASTOLIC BLOOD PRESSURE: 74 MMHG | OXYGEN SATURATION: 98 % | WEIGHT: 245 LBS

## 2020-10-23 DIAGNOSIS — R63.4 WEIGHT LOSS: ICD-10-CM

## 2020-10-23 DIAGNOSIS — G47.33 OSA (OBSTRUCTIVE SLEEP APNEA): Primary | ICD-10-CM

## 2020-10-23 DIAGNOSIS — F45.8 GRINDING OF TEETH: ICD-10-CM

## 2020-10-23 PROCEDURE — G0463 HOSPITAL OUTPT CLINIC VISIT: HCPCS

## 2020-10-23 NOTE — PROGRESS NOTES
"[facility]  Telephone: [phone] / Fax: [phone] [place]  Telephone: [phone] / Fax: [phone] Sendy Hearn    PCP: María Orozco PA    Reason for visit: DIONY f/u    Zohaib Duran is a 54 y.o.male  was last seen at [facility] sleep lab in September 2020. We did HST to evaluate DIONY severity after 50 lbs weight loss. He is here today to discuss sleep study results and treatment recommendations. Unfortunately, study still shows mild DIONY. He would like to resume CPAP. He previously tried nasal pillows but did not tolerate as he continued to open up his mouth at night.  He has significant bruxism and will need an appliance made by dentistry to help with it. He is unaware of snoring. His sleep schedule is 10pm-5:30am. ESS is 6.    SH- no tobacco, drinks 2-3 alc per week, 1-2 tea per day, no energy drinks.    ROS- negative.    DME [facility]    Current Medications:    Current Outpatient Medications:   •  Multiple Vitamin (MULTI VITAMIN DAILY PO), Take  by mouth Daily., Disp: , Rfl:   •  sildenafil (Viagra) 50 MG tablet, TAKE 1 TABLET 1 HOUR PRIOR TO INTERCOURSE. DO NOT EXCEED 1 DOSE IN 24 HOURS., Disp: 9 tablet, Rfl: 5  •  Vitamin D, Cholecalciferol, 1000 units capsule, Take 4,000 Units by mouth Daily., Disp: , Rfl:    also entered in Sleep Questionnaire    Patient  has a past medical history of Hip bursitis, Lumbar radiculopathy, and Sleep apnea.    I have reviewed the Past Medical History, Past Surgical History, Social History and Family History.    Vital Signs /74   Pulse 80   Ht 182.9 cm (72\")   Wt 111 kg (245 lb)   SpO2 98%   BMI 33.23 kg/m²  Body mass index is 33.23 kg/m².    General Alert and oriented. No acute distress noted   Pharynx/Throat Class IV Mallampati airway, large tongue, no evidence of redundant lateral pharyngeal tissue. No oral lesions. No thrush. Moist mucous membranes.   Head Normocephalic. Symmetrical. Atraumatic.    Nose No septal deviation. No drainage "   Chest Wall Normal shape. Symmetric expansion with respiration. No tenderness.   Neck Trachea midline, no thyromegaly or adenopathy    Lungs Clear to auscultation bilaterally. No wheezes. No rhonchi. No rales. Respirations regular, even and unlabored.   Heart Regular rhythm and normal rate. Normal S1 and S2. No murmur   Abdomen Soft, non-tender and non-distended. Normal bowel sounds. No masses.   Extremities Moves all extremities well. No edema   Psychiatric Normal mood and affect.       Testing:  Study-Diagnostic findings: The patient tolerated the home sleep testing with monitoring time of 482 minutes. The data obtained make this a technically adequate study. The apnea hypopneas index(AHI) was 10.3 per sleep hour.  The AHI during supine position was 11.5 per sleep hour. Mean heart rate of 77.6 BPM.  Snoring was noted 4.1% of sleep time. Lowest oxygen saturation during the study was 81%. Saturation below 89% was noted for 28.9 mins.     Impression:  1. DIONY (obstructive sleep apnea)    2. Grinding of teeth    3. Weight loss        Plan  I reviewed DIONY pathophysiology and sleep study results with patient. We discussed adverse outcomes associated with untreated DIONY. I explained to patient how CPAP works to correct DIONY and how OMAD works as well. After our discussion patient agreed to resume treatment with CPAP device. I gave him a detailed written order to take to [facility]. I asked [facility] to program his machine at 5-15cm pressures. They will check and make sure that it is still working properly. We looked at different masks today. He liked the Dreamwear FFM. I will ask [facility] to provide this mask style to the patient.  He will need to inform his dentist that he elected to resume CPAP.    F/u with Dr Zavala in 6-8 weeks.      I appreciate the opportunity to participate in this patient's care.      LUZ Stewart  [facility]  Phone:  [phone]

## 2020-10-27 ENCOUNTER — TELEPHONE (OUTPATIENT)
Dept: SLEEP MEDICINE | Facility: HOSPITAL | Age: 55
End: 2020-10-27

## 2020-12-04 ENCOUNTER — APPOINTMENT (OUTPATIENT)
Dept: SLEEP MEDICINE | Facility: HOSPITAL | Age: 55
End: 2020-12-04

## 2021-03-30 ENCOUNTER — BULK ORDERING (OUTPATIENT)
Dept: CASE MANAGEMENT | Facility: OTHER | Age: 56
End: 2021-03-30

## 2021-03-30 DIAGNOSIS — Z23 IMMUNIZATION DUE: ICD-10-CM

## 2021-06-16 ENCOUNTER — OFFICE VISIT (OUTPATIENT)
Dept: FAMILY MEDICINE CLINIC | Facility: CLINIC | Age: 56
End: 2021-06-16

## 2021-06-16 VITALS
RESPIRATION RATE: 18 BRPM | DIASTOLIC BLOOD PRESSURE: 78 MMHG | HEIGHT: 72 IN | BODY MASS INDEX: 33.72 KG/M2 | WEIGHT: 249 LBS | SYSTOLIC BLOOD PRESSURE: 120 MMHG | HEART RATE: 77 BPM | TEMPERATURE: 98.7 F | OXYGEN SATURATION: 98 %
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
## 2021-06-16 DIAGNOSIS — M54.2 NECK PAIN: ICD-10-CM

## 2021-06-16 DIAGNOSIS — M54.12 CERVICAL RADICULOPATHY: ICD-10-CM

## 2021-06-16 PROCEDURE — 72050 X-RAY EXAM NECK SPINE 4/5VWS: CPT | Performed by: PHYSICIAN ASSISTANT

## 2021-06-16 PROCEDURE — 99213 OFFICE O/P EST LOW 20 MIN: CPT | Performed by: PHYSICIAN ASSISTANT

## 2021-06-16 RX ORDER — METHYLPREDNISOLONE 4 MG/1
TABLET ORAL
Qty: 21 TABLET | Refills: 0 | Status: SHIPPED | OUTPATIENT
Start: 2021-06-16 | End: 2022-02-16 | Stop reason: SDUPTHER

## 2021-06-16 RX ORDER — BACLOFEN 10 MG/1
10 TABLET ORAL 3 TIMES DAILY PRN
Qty: 45 TABLET | Refills: 0 | Status: SHIPPED | OUTPATIENT
Start: 2021-06-16

## 2021-06-16 NOTE — PROGRESS NOTES
Subjective   Zohaib Duran is a 55 y.o. male who presents today for evaluation of shoulder pain.     History of Present Illness   Answers for HPI/ROS submitted by the patient on 6/7/2021  What is the primary reason for your visit?: Other  Please describe your symptoms.: Left shoulder and left side of neck. Tripped and fell on concrete landed on left shoulder  still bothering me after 2 months need to have it looked at  Have you had these symptoms before?: No  How long have you been having these symptoms?: Greater than 2 weeks    Fell 2 months ago - tripped up concrete step up and hit his face and shoulder. He hit his left eyebrow. No headache, dizziness, nausea, vision changes.     Has pain from behind left ear down his left neck and pain into left trapezius muscle. He has had several massages that help but do not resolve pain. Some numbness in the left arm into middle and ring finger. No trouble walking or using hands. Not dropping things.   Left hip is sore- lateral hip. No back pain. No pain, numbness, or tingling in leg.     The following portions of the patient's history were reviewed and updated as appropriate: allergies, current medications, past family history, past medical history, past social history, past surgical history and problem list.    Review of Systems   Constitutional: Negative.    HENT: Negative.    Respiratory: Negative.    Cardiovascular: Negative.    Gastrointestinal: Negative.    Musculoskeletal: Positive for neck pain and neck stiffness.   Neurological: Negative.    Psychiatric/Behavioral: Negative.        Objective   Vitals:    06/16/21 0658   BP: 120/78   Pulse: 77   Resp: 18   Temp: 98.7 °F (37.1 °C)   SpO2: 98%     Body mass index is 33.77 kg/m².    Physical Exam  Vitals and nursing note reviewed.   Constitutional:       Appearance: He is well-developed.   HENT:      Head: Normocephalic and atraumatic.      Right Ear: External ear normal.      Left Ear: External ear normal.   Eyes:       Conjunctiva/sclera: Conjunctivae normal.   Neck:      Thyroid: No thyroid mass or thyromegaly.      Vascular: No carotid bruit.      Trachea: No tracheal deviation.   Cardiovascular:      Rate and Rhythm: Normal rate and regular rhythm.      Heart sounds: Normal heart sounds.   Pulmonary:      Effort: Pulmonary effort is normal.      Breath sounds: Normal breath sounds.   Musculoskeletal:      Right shoulder: No tenderness. Normal range of motion. Normal strength. Normal pulse.      Left shoulder: No tenderness. Normal range of motion. Normal strength. Normal pulse.      Right upper arm: No edema or tenderness.      Left upper arm: No edema or tenderness.      Right elbow: No swelling or effusion. Normal range of motion. No tenderness.      Left elbow: No swelling or effusion. Normal range of motion. No tenderness.      Right forearm: No swelling, deformity or tenderness.      Left forearm: No swelling, deformity or tenderness.      Right hand: Normal strength. Normal sensation.      Left hand: Normal strength. Normal sensation.      Cervical back: Tenderness (left paraspinal tenderness to left trapezius) present. No deformity, lacerations, spasms or bony tenderness (no spinal ttp).      Comments: Negative impingement sign   Skin:     General: Skin is warm and dry.   Neurological:      Mental Status: He is alert and oriented to person, place, and time.      Sensory: No sensory deficit.      Motor: No atrophy or abnormal muscle tone.      Gait: Gait normal.      Deep Tendon Reflexes:      Reflex Scores:       Bicep reflexes are 2+ on the right side and 2+ on the left side.       Brachioradialis reflexes are 2+ on the right side and 2+ on the left side.  Psychiatric:         Speech: Speech normal.         Behavior: Behavior normal.         Thought Content: Thought content normal.         Judgment: Judgment normal.     Cervical Spine xray- no acute changes or fractures appreciated. 1 anterior spur but otherwise disc  spaces are maintained. No xrays for comparison. Await radiology over read.     Assessment/Plan   Diagnoses and all orders for this visit:    1. Fall, initial encounter (Primary)  -     methylPREDNISolone (Medrol) 4 MG dose pack; follow package directions  Dispense: 21 tablet; Refill: 0  -     baclofen (LIORESAL) 10 MG tablet; Take 1 tablet by mouth 3 (Three) Times a Day As Needed for Muscle Spasms.  Dispense: 45 tablet; Refill: 0  -     Ambulatory Referral to Physical Therapy  -     XR Spine Cervical Complete 4 or 5 View (In Office)    2. Neck pain  -     methylPREDNISolone (Medrol) 4 MG dose pack; follow package directions  Dispense: 21 tablet; Refill: 0  -     baclofen (LIORESAL) 10 MG tablet; Take 1 tablet by mouth 3 (Three) Times a Day As Needed for Muscle Spasms.  Dispense: 45 tablet; Refill: 0  -     Ambulatory Referral to Physical Therapy  -     XR Spine Cervical Complete 4 or 5 View (In Office)    3. Cervical radiculopathy  -     methylPREDNISolone (Medrol) 4 MG dose pack; follow package directions  Dispense: 21 tablet; Refill: 0  -     baclofen (LIORESAL) 10 MG tablet; Take 1 tablet by mouth 3 (Three) Times a Day As Needed for Muscle Spasms.  Dispense: 45 tablet; Refill: 0  -     Ambulatory Referral to Physical Therapy  -     XR Spine Cervical Complete 4 or 5 View (In Office)        Assessment and Plan  Patient tripped up a concrete step about 2 months ago and fell on his left side, striking his left forehead, left shoulder, and left hip. He had a laceration on his left eyebrow that healed well. He had no headaches, dizziness, nausea, vision changes, or neurological symptoms. He has had left neck pain and pain down his left trapezius. He has had some intermittent left UE numbness. He has good strength and FROM bilateral UE, no spinal tenderness, but he does have left paraspinal tenderness and tenderness down trapezius medial to scapula. Cervical spine xray today. I will have him try Medrol dose pack,  Baclofen as needed, and will refer to physical therapy. If no resolution of symptoms, new, or changing symptoms, to return to office for consideration of MRI. He has had some left hip pain, however, this has not been as bothersome as his neck. To be seen if worsening hip symptoms.     I spent 25 minutes cariting for Zohaib Duran on this date of service. This time includes time spent by me in the following activities as necessary: preparing for the visit, reviewing tests, specialists records and previous visits, obtaining and/or reviewing a separately obtained history, performing a medically appropriate exam and/or evaluation, counseling and educating the patient, family, garegiver, referring and/or communicating with other healthcare professionals, documenting information in the medical record, independently interpreting results and communicating that information with the patient, family, caregiver, and developing a medically appropriate treatment plan with consideration of other conditions, medications, and treatments.

## 2021-06-24 ENCOUNTER — TELEPHONE (OUTPATIENT)
Dept: FAMILY MEDICINE CLINIC | Facility: CLINIC | Age: 56
End: 2021-06-24

## 2021-06-24 NOTE — TELEPHONE ENCOUNTER
Caller: Emely Duran    Relationship: Emergency Contact    Best call back number: [phone]    What is the best time to reach you: ANYTIME     Who are you requesting to speak with (clinical staff, provider,  specific staff member): CLINICAL       What was the call regarding: PATIENTS WIFE IS CALLING IN REGARDS TO WANTING THE RESULTS ON PATIENTS X RAY OF HIS LEFT SHOULDER ON 06/16/2021.     Do you require a callback: YES

## 2021-06-29 NOTE — TELEPHONE ENCOUNTER
I always keep the report once I receive it I attach it to the original order. I have found results for him from radiology do you need it to scan in to chart?

## 2021-10-11 RX ORDER — SILDENAFIL 50 MG/1
TABLET, FILM COATED ORAL
Qty: 4 TABLET | Refills: 0 | Status: SHIPPED | OUTPATIENT
Start: 2021-10-11

## 2021-10-11 NOTE — TELEPHONE ENCOUNTER
Last ov 8/19/20    Last lab   Take vitamin D 4000 IU daily and follow-up with me in 4 months, fasting.19/20

## 2022-01-25 ENCOUNTER — TELEPHONE (OUTPATIENT)
Dept: FAMILY MEDICINE CLINIC | Facility: CLINIC | Age: 57
End: 2022-01-25

## 2022-01-25 NOTE — TELEPHONE ENCOUNTER
Caller: Emely Duran    Relationship: Emergency Contact    Best call back number: [phone]    What is the medical concern/diagnosis: REFERRAL FOR ORTHOPEDICS    What specialty or service is being requested: BACK ISSUES    What is the provider, practice or medical service name: DR. DAVIDSON @    What is the office location: [facility]    What is the office phone number: [phone]    Any additional details: PLEASE CONTACT PATIENT ONCE THE REFERRAL

## 2022-01-26 ENCOUNTER — OFFICE VISIT (OUTPATIENT)
Dept: FAMILY MEDICINE CLINIC | Facility: CLINIC | Age: 57
End: 2022-01-26

## 2022-01-26 VITALS
OXYGEN SATURATION: 96 % | HEART RATE: 78 BPM | TEMPERATURE: 98.4 F | HEIGHT: 72 IN | RESPIRATION RATE: 18 BRPM | WEIGHT: 249 LBS | BODY MASS INDEX: 33.72 KG/M2

## 2022-01-26 DIAGNOSIS — M25.559 HIP PAIN: ICD-10-CM

## 2022-01-26 DIAGNOSIS — M54.16 LUMBAR RADICULOPATHY: ICD-10-CM

## 2022-01-26 DIAGNOSIS — M25.519 CHRONIC SHOULDER PAIN, UNSPECIFIED LATERALITY: ICD-10-CM

## 2022-01-26 DIAGNOSIS — M54.12 CERVICAL RADICULOPATHY: ICD-10-CM

## 2022-01-26 DIAGNOSIS — M54.2 NECK PAIN: ICD-10-CM

## 2022-01-26 DIAGNOSIS — G89.29 CHRONIC SHOULDER PAIN, UNSPECIFIED LATERALITY: ICD-10-CM

## 2022-01-26 DIAGNOSIS — M54.9 BACK PAIN, UNSPECIFIED BACK LOCATION, UNSPECIFIED BACK PAIN LATERALITY, UNSPECIFIED CHRONICITY: Primary | ICD-10-CM

## 2022-01-26 PROCEDURE — 99213 OFFICE O/P EST LOW 20 MIN: CPT | Performed by: PHYSICIAN ASSISTANT

## 2022-01-26 NOTE — TELEPHONE ENCOUNTER
Patient was seen in follow-up.  He apparently is having some chronic issues then had some more acute back pain that seem to be acute on chronic.  I did discuss with him the need for following up for routine appointment and follow-up and have placed orthopedist referral

## 2022-01-27 LAB
LABCORP SARS-COV-2, NAA 2 DAY TAT: NORMAL
SARS-COV-2 RNA RESP QL NAA+PROBE: DETECTED

## 2022-02-16 ENCOUNTER — OFFICE VISIT (OUTPATIENT)
Dept: ORTHOPEDIC SURGERY | Facility: CLINIC | Age: 57
End: 2022-02-16

## 2022-02-16 VITALS — BODY MASS INDEX: 34 KG/M2 | WEIGHT: 251 LBS | HEIGHT: 72 IN

## 2022-02-16 DIAGNOSIS — M54.2 NECK PAIN: ICD-10-CM

## 2022-02-16 DIAGNOSIS — M54.50 LUMBAR BACK PAIN: ICD-10-CM

## 2022-02-16 DIAGNOSIS — G89.29 CHRONIC MIDLINE THORACIC BACK PAIN: ICD-10-CM

## 2022-02-16 DIAGNOSIS — M54.6 CHRONIC MIDLINE THORACIC BACK PAIN: ICD-10-CM

## 2022-02-16 DIAGNOSIS — R52 PAIN: Primary | ICD-10-CM

## 2022-02-16 PROCEDURE — 99203 OFFICE O/P NEW LOW 30 MIN: CPT | Performed by: ORTHOPAEDIC SURGERY

## 2022-02-16 PROCEDURE — 72070 X-RAY EXAM THORAC SPINE 2VWS: CPT | Performed by: ORTHOPAEDIC SURGERY

## 2022-02-18 RX ORDER — METHYLPREDNISOLONE 4 MG/1
TABLET ORAL
Qty: 21 TABLET | Refills: 0 | Status: SHIPPED | OUTPATIENT
Start: 2022-02-18

## 2022-02-18 NOTE — PROGRESS NOTES
New patient or new problem visit    CC: Neck and interscapular pain    HPI: He has neck and interscapular pain as well as low back pain.  He has knee and hip pain which he thinks may contribute to some weakness in the legs but no muscular weakness or anything new as result of his upper pain he thinks.  He has had no specific treatment.  No numbness tingling bowel or bladder complaints    PFSH: See attached    ROS: As above    PE: BMI 34.4.  Strength in the upper and lower extremities is normal with intact sensation and reflexes.  Tender about the neck and mid scapular region as well as the lumbosacral area.    XRAY: There is a thoracic spine film which is labeled thoracolumbar but it is actually a thoracic x-ray which demonstrates some lower thoracic multilevel spondylosis.  Probably has similar findings above and below but there are no comparison views.    Other: n/a    Impression: Cervical thoracic and lumbar back pain.  I plan physical therapy and a Medrol Dosepak and will did deeper as we need to.    Plan: As above

## 2023-03-30 ENCOUNTER — OFFICE VISIT (OUTPATIENT)
Dept: FAMILY MEDICINE CLINIC | Facility: CLINIC | Age: 58
End: 2023-03-30
Payer: COMMERCIAL

## 2023-03-30 VITALS
OXYGEN SATURATION: 95 % | BODY MASS INDEX: 34 KG/M2 | HEART RATE: 88 BPM | TEMPERATURE: 97.4 F | HEIGHT: 72 IN | DIASTOLIC BLOOD PRESSURE: 88 MMHG | WEIGHT: 251 LBS | SYSTOLIC BLOOD PRESSURE: 118 MMHG

## 2023-03-30 DIAGNOSIS — L03.90 CELLULITIS, UNSPECIFIED CELLULITIS SITE: ICD-10-CM

## 2023-03-30 DIAGNOSIS — R50.9 FEVER, UNSPECIFIED FEVER CAUSE: Primary | ICD-10-CM

## 2023-03-30 PROCEDURE — 99213 OFFICE O/P EST LOW 20 MIN: CPT | Performed by: NURSE PRACTITIONER

## 2023-03-30 RX ORDER — DOXYCYCLINE HYCLATE 100 MG/1
100 CAPSULE ORAL 2 TIMES DAILY
Qty: 20 CAPSULE | Refills: 0 | Status: SHIPPED | OUTPATIENT
Start: 2023-03-30

## 2023-03-30 NOTE — PROGRESS NOTES
"Chief Complaint  Celluitis in lower left leg  (Patient states that his left leg started to bother him last night. Patient states that his leg is very tight and sore. )    Subjective        Zohaibmoira Duran presents to [facility]  History of Present Illness  This is a 57-year-old male patient here today for evaluation of left leg redness and swelling.  Patient reports leg started swelling last night with redness.  Had a fever yesterday of 103 with body aches.  He has a history of cellulitis in the past.  He is afebrile today.      Objective   Vital Signs:  /88   Pulse 88   Temp 97.4 °F (36.3 °C)   Ht 182.9 cm (72.01\")   Wt 114 kg (251 lb)   SpO2 95%   BMI 34.03 kg/m²   Estimated body mass index is 34.03 kg/m² as calculated from the following:    Height as of this encounter: 182.9 cm (72.01\").    Weight as of this encounter: 114 kg (251 lb).             Physical Exam  Vitals and nursing note reviewed. Exam conducted with a chaperone present (wife).   Cardiovascular:      Rate and Rhythm: Normal rate and regular rhythm.      Pulses: Normal pulses.      Heart sounds: Normal heart sounds.   Pulmonary:      Effort: Pulmonary effort is normal.      Breath sounds: Normal breath sounds.   Musculoskeletal:      Left lower leg: Edema present.      Comments: Patient has redness and warmth in the left leg.  Negative Homans' sign.  No tenderness in calf   Psychiatric:         Mood and Affect: Mood normal.        Result Review :                   Assessment and Plan   Diagnoses and all orders for this visit:    1. Fever, unspecified fever cause (Primary)  -     CBC & Differential    2. Cellulitis, unspecified cellulitis site    Other orders  -     doxycycline (VIBRAMYCIN) 100 MG capsule; Take 1 capsule by mouth 2 (Two) Times a Day.  Dispense: 20 capsule; Refill: 0    I will get a CBC today.  I will start doxycycline.  If symptoms worsen he will report to our ER         Follow Up   No " follow-ups on file.  Patient was given instructions and counseling regarding his condition or for health maintenance advice. Please see specific information pulled into the AVS if appropriate.

## 2023-03-31 LAB
BASOPHILS # BLD AUTO: 0.03 10*3/MM3 (ref 0–0.2)
BASOPHILS NFR BLD AUTO: 0.2 % (ref 0–1.5)
EOSINOPHIL # BLD AUTO: 0.05 10*3/MM3 (ref 0–0.4)
EOSINOPHIL NFR BLD AUTO: 0.4 % (ref 0.3–6.2)
ERYTHROCYTE [DISTWIDTH] IN BLOOD BY AUTOMATED COUNT: 12.3 % (ref 12.3–15.4)
HCT VFR BLD AUTO: 44.7 % (ref 37.5–51)
HGB BLD-MCNC: 15.4 G/DL (ref 13–17.7)
IMM GRANULOCYTES # BLD AUTO: 0.04 10*3/MM3 (ref 0–0.05)
IMM GRANULOCYTES NFR BLD AUTO: 0.3 % (ref 0–0.5)
LYMPHOCYTES # BLD AUTO: 1.02 10*3/MM3 (ref 0.7–3.1)
LYMPHOCYTES NFR BLD AUTO: 7.6 % (ref 19.6–45.3)
MCH RBC QN AUTO: 31.3 PG (ref 26.6–33)
MCHC RBC AUTO-ENTMCNC: 34.5 G/DL (ref 31.5–35.7)
MCV RBC AUTO: 90.9 FL (ref 79–97)
MONOCYTES # BLD AUTO: 1.39 10*3/MM3 (ref 0.1–0.9)
MONOCYTES NFR BLD AUTO: 10.3 % (ref 5–12)
NEUTROPHILS # BLD AUTO: 10.92 10*3/MM3 (ref 1.7–7)
NEUTROPHILS NFR BLD AUTO: 81.2 % (ref 42.7–76)
NRBC BLD AUTO-RTO: 0 /100 WBC (ref 0–0.2)
PLATELET # BLD AUTO: 154 10*3/MM3 (ref 140–450)
RBC # BLD AUTO: 4.92 10*6/MM3 (ref 4.14–5.8)
WBC # BLD AUTO: 13.45 10*3/MM3 (ref 3.4–10.8)

## 2023-03-31 NOTE — PROGRESS NOTES
WBC is elevated.  Patient was started on antibiotics yesterday.  Please follow-up with María if leg is not improving or fever returns.

## 2023-04-12 NOTE — TELEPHONE ENCOUNTER
Patient wanting to know the status on the prior auth for his Viagra 50 mg, he said the pharmacy sent us that request last Friday.  His best call back is [phone]    No

## 2024-03-21 ENCOUNTER — OFFICE VISIT (OUTPATIENT)
Dept: ORTHOPEDIC SURGERY | Facility: CLINIC | Age: 59
End: 2024-03-21
Payer: COMMERCIAL

## 2024-03-21 VITALS — BODY MASS INDEX: 36.44 KG/M2 | WEIGHT: 269 LBS | HEIGHT: 72 IN | TEMPERATURE: 97.7 F

## 2024-03-21 DIAGNOSIS — S89.91XA KNEE INJURY, RIGHT, INITIAL ENCOUNTER: ICD-10-CM

## 2024-03-21 DIAGNOSIS — R52 PAIN: Primary | ICD-10-CM

## 2024-03-21 NOTE — PROGRESS NOTES
Patient: Zohaib Duran  YOB: 1965 58 y.o. male  Medical Record Number: [id number]    Chief Complaints:   Chief Complaint   Patient presents with    Right Knee - Initial Evaluation       History of Present Illness:Zohaib Duran is a 58 y.o. male who presents with complaints of right knee injury.  Patient reports he was moving his kids in October he was going down the ramp his foot caught twisted his right knee heard and felt a pop in the right knee with acute onset of pain, the pain has been intermittent since but over the last week severe, has had multiple episodes of instability and feelings as though the knee is going to give way.    Allergies:   Allergies   Allergen Reactions    Sulfa Antibiotics Rash and Swelling       Medications:   Current Outpatient Medications   Medication Sig Dispense Refill    baclofen (LIORESAL) 10 MG tablet Take 1 tablet by mouth 3 (Three) Times a Day As Needed for Muscle Spasms. 45 tablet 0    doxycycline (VIBRAMYCIN) 100 MG capsule Take 1 capsule by mouth 2 (Two) Times a Day. 20 capsule 0    methylPREDNISolone (MEDROL) 4 MG dose pack Use as directed by package instructions 21 tablet 0    Multiple Vitamin (MULTI VITAMIN DAILY PO) Take  by mouth Daily.      sildenafil (VIAGRA) 50 MG tablet TAKE ONE TABLET BY MOUTH ONE HOUR PRIOR TO INTERCOURSE. DO NOT EXCEED ONE DOSE IN 24 HOURS. 4 tablet 0    Vitamin D, Cholecalciferol, 1000 units capsule Take 4 capsules by mouth Daily.       No current facility-administered medications for this visit.         The following portions of the patient's history were reviewed and updated as appropriate: allergies, current medications, past family history, past medical history, past social history, past surgical history and problem list.    Review of Systems:   14 point review of systems was performed. All systems negative except pertinent positives/negatives listed in HPI above    Physical Exam:   Vitals:    03/21/24 0916   Temp: 97.7 °F (36.5  "°C)   TempSrc: Temporal   Weight: 122 kg (269 lb)   Height: 182.9 cm (72.01\")   PainSc:   5   PainLoc: Knee       General: A and O x 3, ASA, NAD    Skin clear no unusual lesions noted  Right knee patient has trace amount of effusion noted with 110 degrees flexion neutral in extension with a positive medial Shyla negative Lockman calf soft and nontender      Radiology:  Xrays 3views (ap,lateral, sunrise) right knee were ordered and reviewed today secondary to pain and show mild arthritic changes.  Comparative views are not available    Assessment/Plan: Right knee injury with recurrent pain and mechanical symptoms    Patient and I discussed options, we will proceed with an MRI of the right knee to further evaluate once we get the results back we will contact patient regarding treatment options, will continue with ice, elevation, rest, NSAIDs      LUZ Puente  3/21/2024  Answers submitted by the patient for this visit:  Primary Reason for Visit (Submitted on 3/14/2024)  What is the primary reason for your visit?: Other  Other (Submitted on 3/14/2024)  Please describe your symptoms.: knee pain  Have you had these symptoms before?: Yes  How long have you been having these symptoms?: Greater than 2 weeks  Please list any medications you are currently taking for this condition.: none  Please describe any probable cause for these symptoms. : helping the kids move    "

## 2024-04-11 ENCOUNTER — HOSPITAL ENCOUNTER (OUTPATIENT)
Dept: MRI IMAGING | Facility: HOSPITAL | Age: 59
Discharge: HOME | End: 2024-04-11
Admitting: NURSE PRACTITIONER
Payer: COMMERCIAL

## 2024-04-11 DIAGNOSIS — S89.91XA KNEE INJURY, RIGHT, INITIAL ENCOUNTER: ICD-10-CM

## 2024-04-11 PROCEDURE — 73721 MRI JNT OF LWR EXTRE W/O DYE: CPT

## 2024-04-12 ENCOUNTER — TELEPHONE (OUTPATIENT)
Dept: ORTHOPEDIC SURGERY | Facility: CLINIC | Age: 59
End: 2024-04-12
Payer: COMMERCIAL

## 2024-04-12 DIAGNOSIS — G89.29 CHRONIC PAIN OF RIGHT KNEE: Primary | ICD-10-CM

## 2024-04-12 DIAGNOSIS — M25.561 CHRONIC PAIN OF RIGHT KNEE: Primary | ICD-10-CM

## 2024-04-12 NOTE — TELEPHONE ENCOUNTER
----- Message from LUZ Mohamud sent at 4/11/2024  1:28 PM EDT -----  Please let patient know that the MRI of his right knee does show some mild arthritic changes, but thankfully no fractures or meniscus tears.  If still having pain would recommend physical therapy and anti-inflammatories over-the-counter or Tylenol as needed

## 2024-04-12 NOTE — TELEPHONE ENCOUNTER
Spoke with patient, relayed MRI of right knee results and plan of care per LUZ Mueller.     Patient verbalized understanding of results and is agreeable to physical therapy. Orders placed, patient made aware scheduling will reach out to him.

## 2024-05-06 ENCOUNTER — TELEPHONE (OUTPATIENT)
Dept: ORTHOPEDIC SURGERY | Facility: CLINIC | Age: 59
End: 2024-05-06
Payer: COMMERCIAL

## 2024-05-07 DIAGNOSIS — S89.91XA KNEE INJURY, RIGHT, INITIAL ENCOUNTER: ICD-10-CM

## 2024-05-07 DIAGNOSIS — M25.561 CHRONIC PAIN OF RIGHT KNEE: Primary | ICD-10-CM

## 2024-05-07 DIAGNOSIS — R52 PAIN: ICD-10-CM

## 2024-05-07 DIAGNOSIS — G89.29 CHRONIC PAIN OF RIGHT KNEE: Primary | ICD-10-CM

## 2025-01-10 ENCOUNTER — PATIENT ROUNDING (BHMG ONLY) (OUTPATIENT)
Dept: FAMILY MEDICINE CLINIC | Facility: CLINIC | Age: 60
End: 2025-01-10
Payer: COMMERCIAL

## 2025-01-10 ENCOUNTER — OFFICE VISIT (OUTPATIENT)
Dept: FAMILY MEDICINE CLINIC | Facility: CLINIC | Age: 60
End: 2025-01-10
Payer: COMMERCIAL

## 2025-01-10 VITALS
TEMPERATURE: 98.1 F | DIASTOLIC BLOOD PRESSURE: 86 MMHG | RESPIRATION RATE: 14 BRPM | OXYGEN SATURATION: 98 % | BODY MASS INDEX: 36.03 KG/M2 | WEIGHT: 266 LBS | HEIGHT: 72 IN | HEART RATE: 84 BPM | SYSTOLIC BLOOD PRESSURE: 128 MMHG

## 2025-01-10 DIAGNOSIS — Z11.59 ENCOUNTER FOR HEPATITIS C SCREENING TEST FOR LOW RISK PATIENT: ICD-10-CM

## 2025-01-10 DIAGNOSIS — E55.9 VITAMIN D DEFICIENCY: ICD-10-CM

## 2025-01-10 DIAGNOSIS — Z12.5 PROSTATE CANCER SCREENING: ICD-10-CM

## 2025-01-10 DIAGNOSIS — E78.5 DYSLIPIDEMIA: ICD-10-CM

## 2025-01-10 DIAGNOSIS — M54.2 NECK PAIN: ICD-10-CM

## 2025-01-10 DIAGNOSIS — M72.2 PLANTAR FASCIITIS OF LEFT FOOT: ICD-10-CM

## 2025-01-10 DIAGNOSIS — M70.70 BURSITIS OF HIP, UNSPECIFIED BURSA, UNSPECIFIED LATERALITY: ICD-10-CM

## 2025-01-10 DIAGNOSIS — N52.9 ERECTILE DYSFUNCTION, UNSPECIFIED ERECTILE DYSFUNCTION TYPE: ICD-10-CM

## 2025-01-10 DIAGNOSIS — Z12.11 COLON CANCER SCREENING: ICD-10-CM

## 2025-01-10 DIAGNOSIS — Z00.00 ROUTINE ADULT HEALTH MAINTENANCE: Primary | ICD-10-CM

## 2025-01-10 DIAGNOSIS — G47.33 OBSTRUCTIVE SLEEP APNEA: ICD-10-CM

## 2025-01-10 DIAGNOSIS — M54.12 CERVICAL RADICULOPATHY: ICD-10-CM

## 2025-01-10 DIAGNOSIS — M54.16 LUMBAR RADICULOPATHY: ICD-10-CM

## 2025-01-10 PROCEDURE — 99396 PREV VISIT EST AGE 40-64: CPT | Performed by: PHYSICIAN ASSISTANT

## 2025-01-10 PROCEDURE — 99214 OFFICE O/P EST MOD 30 MIN: CPT | Performed by: PHYSICIAN ASSISTANT

## 2025-01-10 NOTE — PROGRESS NOTES
Subjective   Zohaib Duran is a 59 y.o. male who presents today in follow-up of dyslipidemia, vitamin D deficiency, erectile dysfunction, DIONY, plantar fasciitis, back pain, neck pain, knee pain, and specialists.      History of Present Illness     Cataracts- will take out when he is 62.   Yesterday, breathed in protein powder dust. Irritated and cough since yesterday but is improving. No signs of illness.     Dyslipidemia- on no medications.   2020-patient is on no medication has worked on diet and exercise for significant weight loss.  He had cut out junk food but he has restarted some. He was exercising on [employer] machine- every day for 30 minutes but has stopped.   Vitamin D deficiency- not taking for a while.   2020-he was taking vitamin D 4000 IU once daily.    Erectile dysfunction- no specialist or treatment.   2020-patient has been cleared by cardiology for Viagra and has taken without AE with medication.    DIONY- using CPAP but not seeing sleep medicine.   2020-not using CPAP- reports he has not used in a long time. Weight was 277 then came here 2018 at 241 lbs. He reports he got down to 230 lbs but has gained some weight.     Plantar fasciitis in left foot- no issues today.   He was previously seeing podiatry. [facility] ankle [facility] foot. No injections. Had brace for night and is doing stretches in the day. Reports his foot pain resolved an dno problems.   Back pain- he slipped in the snow 2 days ago. No injuries but is sore. He continues with pain but no concerns or need for additional treatment at this time.   2022-he reported his back always bothers him but he had worsening for 2 days.  He was positive for COVID-19 infection.  Shoulder pain continues and continues with hip pain. Wife tried to make an appt to see Dr Catalan. They would not see him without referral.     Neck pain with radiculopathy- he slipped in the snow 2 days ago. No injuries but is sore. He continues with pain but no concerns or need for  additional treatment at this time.  6/2021-  He had pain from behind left ear down his left neck and pain into left trapezius muscle. He had several massages that helped but did not resolve pain. Some numbness in the left arm into middle and ring finger. No trouble walking or using hands. Not dropping things.   He also reported his left lateral hip was sore. No back pain at that time, and no pain, numbness, or tingling in leg  He fell 2 months prior - tripped up concrete step up and hit his face and shoulder. He hit his left eyebrow. No headache, dizziness, nausea, vision changes.  Cervical spine xray with OA and facet arthropathy.     Right knee pain- seen by orthopedist with xray and MRI with mild arthritis.PT helped but still has pain. Not compliant with HEP.     History of COVID-19 infection-  2022-he reported his back always bothers him but he had worsening for 2 days.  He also had pain at the bottom of his shoulder blades and cough but no SOA, nasal congestion, scratchy throat, fever for 1 night but resolved. He was feeling bad when he got up and moved around but that improved. No ear pain, V, D, loss of taste or smell.     Patient's specialists:  Cardiology-Dr. Cervantes-last appointment 8/2017 for shortness of breath, dyspnea on exertion, and abnormal EKG.  Noted to have first-degree AV block and poor R wave progression which could be body habitus and lead positioning.  Recommended stress echo and lifestyle modification.  Stress echocardiogram-LVEF 60 to 65%, mild to moderately dilated left atrium, mild mitral regurgitation, trace tricuspid regurgitation, RV systolic pressure 25 to 30 mmHg.  Negative stress echo.  Orthopedic surgery- Dr Brown, María Echols, APRN- last appt 3/2024 for right knee injury 10/2023 moving his kids down a ramp and got his foot caught.  X-ray with mild arthritic changes.  Proceed with MRI.  MRI right knee with mild arthritic changes but no fracture or meniscus tears.  If continued  pain, recommend physical therapy and anti-inflammatory or Tylenol.  2016 for right shoulder and right hip pain. Advised right shoulder AC arthritis with impingement syndrome symptoms and right hip with trochanteric bursitis.   Sleep medicine-LUZ Stewart-last appointment 10/2020 for DIONY and grinding of teeth with weight loss.  Advised CPAP.  Follow-up 6 to 8 weeks.  Patient canceled follow-up appointment and did not reschedule.      The following portions of the patient's history were reviewed and updated as appropriate: allergies, current medications, past family history, past medical history, past social history, past surgical history and problem list.    Review of Systems    Objective    Vitals:    01/10/25 1239   BP: 128/86   Pulse: 84   Resp: 14   Temp: 98.1 °F (36.7 °C)   SpO2: 98%     Body mass index is 36.07 kg/m².    Physical Exam   Constitutional: He is oriented to person, place, and time. He appears well-developed. No distress.   HENT:   Head: Normocephalic and atraumatic.   Right Ear: Hearing, tympanic membrane, external ear and ear canal normal.   Left Ear: Hearing, tympanic membrane, external ear and ear canal normal.   Nose: Nose normal.   Eyes: Pupils are equal, round, and reactive to light. Conjunctivae and lids are normal.   Neck: No JVD present. Carotid bruit is not present. No tracheal deviation present. No thyroid mass and no thyromegaly present.   Cardiovascular: Normal rate, regular rhythm and normal heart sounds. Exam reveals no gallop and no friction rub.   No murmur heard.  Pulses:       Radial pulses are 2+ on the right side and 2+ on the left side.        Posterior tibial pulses are 2+ on the right side and 2+ on the left side.   Pulmonary/Chest: Effort normal and breath sounds normal. No respiratory distress. He has no wheezes. He has no rhonchi. He has no rales.   Abdominal: Soft. Bowel sounds are normal. He exhibits no distension and no abdominal bruit. There is no abdominal  tenderness. There is no rigidity, no rebound and no guarding. No hernia.   Musculoskeletal: Normal range of motion. No tenderness or deformity.      Comments: Normal strength.   Lymphadenopathy:     He has no cervical adenopathy.   Neurological: He is alert and oriented to person, place, and time. He has normal reflexes. He displays normal reflexes. No cranial nerve deficit or sensory deficit. He exhibits normal muscle tone. Coordination and gait normal.   Skin: Skin is warm and dry. No rash noted. He is not diaphoretic. No erythema.   Psychiatric: His speech is normal and behavior is normal. Thought content normal.   Nursing note and vitals reviewed.      Assessment & Plan   Diagnoses and all orders for this visit:    1. Routine adult health maintenance (Primary)  -     CBC & Differential  -     Comprehensive Metabolic Panel  -     Lipid Panel With LDL / HDL Ratio  -     TSH  -     PSA Screen  -     Urinalysis With Culture If Indicated -  -     Hepatitis C Antibody    2. Colon cancer screening  -     Ambulatory Referral For Screening Colonoscopy    3. Prostate cancer screening  -     PSA Screen    4. Encounter for hepatitis C screening test for low risk patient  -     Hepatitis C Antibody    5. Dyslipidemia  -     Comprehensive Metabolic Panel  -     Hemoglobin A1c  -     CK  -     Lipid Panel With LDL / HDL Ratio    6. Vitamin D deficiency  -     Comprehensive Metabolic Panel  -     Vitamin D,25-Hydroxy    7. Erectile dysfunction, unspecified erectile dysfunction type  -     CBC & Differential  -     Comprehensive Metabolic Panel  -     Hemoglobin A1c  -     Lipid Panel With LDL / HDL Ratio  -     TSH  -     PSA Screen  -     Urinalysis With Culture If Indicated -  -     Hepatitis C Antibody    8. Obstructive sleep apnea  -     Ambulatory Referral to Sleep Medicine    9. Plantar fasciitis of left foot  -     Hemoglobin A1c    10. Lumbar radiculopathy    11. Bursitis of hip, unspecified bursa, unspecified  laterality    12. Neck pain    13. Cervical radiculopathy      Assessment and plan  CPE performed today. He is due for colonoscopy and updated PSA.  I will refer for colonoscopy and update PSA today.  He will also need to contact his insurance for coverage and location of coverage for Tdap that is due this year. He should consider annual flu shot, COVID-19 vaccination, and RSV vaccination if covered.  If he is willing to update vaccinations, we can check varicella titer to see if he is immune and would need Shingrix.  Preventative counseling to ensure healthy lifestyle-diet and exercise, updated health maintenance, and follow-up of all conditions, including sleep medicine for DIONY.    Patient will have fasting labs. Call if no results in 1 week. Stability of conditions, plan, follow up, and further recommendations pending labs.    Dyslipidemia- Await repeat labs for further recommendations.  Vitamin D deficiency- Dosing recommendations pending lab results.  Erectile dysfunction- Consideration of cardiology follow-up with slightly abnormal echo with previous test and the need for clearance from cardiology and ophthalmology if medication is needed in the future.  DIONY- Patient is using CPAP but is not following with sleep medicine.  I will refer back to sleep medicine today.  I counseled him today on the need to follow-up with them annually.  Plantar fasciitis in left foot-  He has no complaints today.  To be seen if recurrence.   Back pain-  Patient with chronic back pain.  Consideration of physical therapy, updated imaging, or specialist follow-up if worsening, new or changing symptoms.  Neck pain with radiculopathy, shoulder pain- Consideration of physical therapy and/or updated imaging and specialist if worsening, new or changing symptoms.  Right knee pain-  Patient to restart HEP and if no improvement, consideration of returning to physical therapy or orthopedist.   History of COVID-19 infection- No concerns or  residual symptoms.    I spent 45 minutes caring for Zohaib Duran on this date of service, including 15 minutes for CPE and 30 minutes for follow-up and problem focused visit. This time includes time spent by me in the following activities as necessary: preparing for the visit, reviewing tests, specialists records and previous visits, obtaining and/or reviewing a separately obtained history, performing a medically appropriate exam and/or evaluation, counseling and educating the patient, family, caregiver, referring and/or communicating with other healthcare professionals, documenting information in the medical record, independently interpreting results and communicating that information with the patient, family, caregiver, and developing a medically appropriate treatment plan with consideration of other conditions, medications, and treatments.

## 2025-01-10 NOTE — PROGRESS NOTES
Subjective   Zohaib Duran is a 59 y.o. male who presents today for CPE.     History of Present Illness     Last colonoscopy- at age 52- 1 polyp- not sure when he follows up. Thinks 3-5 years.   Last Tdap- 2015.   Prevnar- has not had vaccination  Pneumovax- has not had vaccination  Hepatitis A- has not vaccination  Last flu shot- does not take  Shingrix- not sure had varicella as a child. Has not had Shingrix vaccinations  Covid 19- does not take  RSV- has not had vaccination    Past Medical History:   Diagnosis Date    Allergic     sulfa    Hip bursitis     Lumbar radiculopathy     Sleep apnea     C-Pap compliant     Social History     Socioeconomic History    Marital status:    Tobacco Use    Smoking status: Former     Current packs/day: 0.00     Average packs/day: 1 pack/day for 4.0 years (4.0 ttl pk-yrs)     Types: Cigarettes     Quit date: 1992     Years since quittin.0    Smokeless tobacco: Never   Vaping Use    Vaping status: Never Used   Substance and Sexual Activity    Alcohol use: Yes     Comment: occasionally    Drug use: No    Sexual activity: Yes     Partners: Female     Birth control/protection: Vasectomy, Surgical      Family History   Problem Relation Age of Onset    Glaucoma Mother     Kidney disease Other         STONES        The following portions of the patient's history were reviewed and updated as appropriate: allergies, current medications, past family history, past medical history, past social history, past surgical history and problem list.    Review of Systems    Objective   Vitals:    01/10/25 1239   BP: 128/86   Pulse: 84   Resp: 14   Temp: 98.1 °F (36.7 °C)   SpO2: 98%     Body mass index is 36.07 kg/m².     Physical Exam   Constitutional: He is oriented to person, place, and time. He appears well-developed. No distress.   HENT:   Head: Normocephalic and atraumatic.   Right Ear: Hearing, tympanic membrane, external ear and ear canal normal.   Left Ear: Hearing,  tympanic membrane, external ear and ear canal normal.   Nose: Nose normal.   Eyes: Pupils are equal, round, and reactive to light. Conjunctivae and lids are normal.   Neck: No JVD present. Carotid bruit is not present. No tracheal deviation present. No thyroid mass and no thyromegaly present.   Cardiovascular: Normal rate, regular rhythm and normal heart sounds. Exam reveals no gallop and no friction rub.   No murmur heard.  Pulses:       Radial pulses are 2+ on the right side and 2+ on the left side.        Posterior tibial pulses are 2+ on the right side and 2+ on the left side.   Pulmonary/Chest: Effort normal and breath sounds normal. No respiratory distress. He has no wheezes. He has no rhonchi. He has no rales.   Abdominal: Soft. Bowel sounds are normal. He exhibits no distension and no abdominal bruit. There is no abdominal tenderness. There is no rigidity, no rebound and no guarding. No hernia.   Musculoskeletal: Normal range of motion. No tenderness or deformity.      Comments: Normal strength.   Lymphadenopathy:     He has no cervical adenopathy.   Neurological: He is alert and oriented to person, place, and time. He has normal reflexes. He displays normal reflexes. No cranial nerve deficit or sensory deficit. He exhibits normal muscle tone. Coordination and gait normal.   Skin: Skin is warm and dry. No rash noted. He is not diaphoretic. No erythema.   Psychiatric: His speech is normal and behavior is normal. Judgment and thought content normal.   Nursing note and vitals reviewed.      Assessment & Plan   Diagnoses and all orders for this visit:    1. Routine adult health maintenance (Primary)  -     CBC & Differential  -     Comprehensive Metabolic Panel  -     Lipid Panel With LDL / HDL Ratio  -     TSH  -     PSA Screen  -     Urinalysis With Culture If Indicated -  -     Hepatitis C Antibody    2. Colon cancer screening  -     Ambulatory Referral For Screening Colonoscopy    3. Prostate cancer  screening  -     PSA Screen    4. Encounter for hepatitis C screening test for low risk patient  -     Hepatitis C Antibody    5. Dyslipidemia  -     Comprehensive Metabolic Panel  -     Hemoglobin A1c  -     CK  -     Lipid Panel With LDL / HDL Ratio    6. Vitamin D deficiency  -     Comprehensive Metabolic Panel  -     Vitamin D,25-Hydroxy    7. Erectile dysfunction, unspecified erectile dysfunction type  -     CBC & Differential  -     Comprehensive Metabolic Panel  -     Hemoglobin A1c  -     Lipid Panel With LDL / HDL Ratio  -     TSH  -     PSA Screen  -     Urinalysis With Culture If Indicated -  -     Hepatitis C Antibody    8. Obstructive sleep apnea  -     Ambulatory Referral to Sleep Medicine    9. Plantar fasciitis of left foot  -     Hemoglobin A1c    10. Lumbar radiculopathy    11. Bursitis of hip, unspecified bursa, unspecified laterality    12. Neck pain    13. Cervical radiculopathy      Assessment and Plan  CPE performed today. He is due for colonoscopy and updated PSA.  I will refer for colonoscopy and update PSA today.  He will also need to contact his insurance for coverage and location of coverage for Tdap that is due this year. He should consider annual flu shot, COVID-19 vaccination, and RSV vaccination if covered.  If he is willing to update vaccinations, we can check varicella titer to see if he is immune and would need Shingrix.  Preventative counseling to ensure healthy lifestyle-diet and exercise, updated health maintenance, and follow-up of all conditions, including sleep medicine for DIONY.

## 2025-01-10 NOTE — PROGRESS NOTES
"My name is Jalil Pina     I am the Practice Manager with   [facility] [address] [address] [phone]      I am messaging to ask you about our practice and your recent visit.     Tell me about your visit with us. What things went well?         We're always looking for ways to make our patients' experiences even better. Do you have recommendations on ways we may improve?       Overall were you satisfied with your first visit to our practice?        Is there anything else I can do for you?     Also, please note that you may receive a survey from \"[employer]\" please take time to fill that out, as it provides important feedback for our office.       Thank you, and have a great day.   "

## 2025-01-11 LAB
25(OH)D3+25(OH)D2 SERPL-MCNC: 13.8 NG/ML (ref 30–100)
ALBUMIN SERPL-MCNC: 4.4 G/DL (ref 3.5–5.2)
ALBUMIN/GLOB SERPL: 1.4 G/DL
ALP SERPL-CCNC: 61 U/L (ref 39–117)
ALT SERPL-CCNC: 19 U/L (ref 1–41)
APPEARANCE UR: CLEAR
AST SERPL-CCNC: 17 U/L (ref 1–40)
BACTERIA #/AREA URNS HPF: NORMAL /[HPF]
BASOPHILS # BLD AUTO: 0.06 10*3/MM3 (ref 0–0.2)
BASOPHILS NFR BLD AUTO: 0.8 % (ref 0–1.5)
BILIRUB SERPL-MCNC: 1 MG/DL (ref 0–1.2)
BILIRUB UR QL STRIP: NEGATIVE
BUN SERPL-MCNC: 11 MG/DL (ref 6–20)
BUN/CREAT SERPL: 10.9 (ref 7–25)
CALCIUM SERPL-MCNC: 9.5 MG/DL (ref 8.6–10.5)
CASTS URNS QL MICRO: NORMAL /LPF
CHLORIDE SERPL-SCNC: 103 MMOL/L (ref 98–107)
CHOLEST SERPL-MCNC: 168 MG/DL (ref 0–200)
CK SERPL-CCNC: 85 U/L (ref 20–200)
CO2 SERPL-SCNC: 27.8 MMOL/L (ref 22–29)
COLOR UR: YELLOW
CREAT SERPL-MCNC: 1.01 MG/DL (ref 0.76–1.27)
EGFRCR SERPLBLD CKD-EPI 2021: 86.7 ML/MIN/1.73
EOSINOPHIL # BLD AUTO: 0.14 10*3/MM3 (ref 0–0.4)
EOSINOPHIL NFR BLD AUTO: 1.8 % (ref 0.3–6.2)
EPI CELLS #/AREA URNS HPF: NORMAL /HPF (ref 0–10)
ERYTHROCYTE [DISTWIDTH] IN BLOOD BY AUTOMATED COUNT: 12.2 % (ref 12.3–15.4)
GLOBULIN SER CALC-MCNC: 3.1 GM/DL
GLUCOSE SERPL-MCNC: 88 MG/DL (ref 65–99)
GLUCOSE UR QL STRIP: NEGATIVE
HBA1C MFR BLD: 5.3 % (ref 4.8–5.6)
HCT VFR BLD AUTO: 44 % (ref 37.5–51)
HCV IGG SERPL QL IA: NON REACTIVE
HDLC SERPL-MCNC: 43 MG/DL (ref 40–60)
HGB BLD-MCNC: 14.8 G/DL (ref 13–17.7)
HGB UR QL STRIP: NEGATIVE
IMM GRANULOCYTES # BLD AUTO: 0.04 10*3/MM3 (ref 0–0.05)
IMM GRANULOCYTES NFR BLD AUTO: 0.5 % (ref 0–0.5)
KETONES UR QL STRIP: NEGATIVE
LDLC SERPL CALC-MCNC: 108 MG/DL (ref 0–100)
LDLC/HDLC SERPL: 2.48 {RATIO}
LEUKOCYTE ESTERASE UR QL STRIP: NEGATIVE
LYMPHOCYTES # BLD AUTO: 0.71 10*3/MM3 (ref 0.7–3.1)
LYMPHOCYTES NFR BLD AUTO: 9.1 % (ref 19.6–45.3)
MCH RBC QN AUTO: 31.3 PG (ref 26.6–33)
MCHC RBC AUTO-ENTMCNC: 33.6 G/DL (ref 31.5–35.7)
MCV RBC AUTO: 93 FL (ref 79–97)
MICRO URNS: NORMAL
MICRO URNS: NORMAL
MONOCYTES # BLD AUTO: 0.91 10*3/MM3 (ref 0.1–0.9)
MONOCYTES NFR BLD AUTO: 11.7 % (ref 5–12)
NEUTROPHILS # BLD AUTO: 5.9 10*3/MM3 (ref 1.7–7)
NEUTROPHILS NFR BLD AUTO: 76.1 % (ref 42.7–76)
NITRITE UR QL STRIP: NEGATIVE
NRBC BLD AUTO-RTO: 0 /100 WBC (ref 0–0.2)
PH UR STRIP: 5.5 [PH] (ref 5–7.5)
PLATELET # BLD AUTO: 168 10*3/MM3 (ref 140–450)
POTASSIUM SERPL-SCNC: 4.7 MMOL/L (ref 3.5–5.2)
PROT SERPL-MCNC: 7.5 G/DL (ref 6–8.5)
PROT UR QL STRIP: NEGATIVE
PSA SERPL-MCNC: 1.45 NG/ML (ref 0–4)
RBC # BLD AUTO: 4.73 10*6/MM3 (ref 4.14–5.8)
RBC #/AREA URNS HPF: NORMAL /HPF (ref 0–2)
SODIUM SERPL-SCNC: 140 MMOL/L (ref 136–145)
SP GR UR STRIP: 1.02 (ref 1–1.03)
TRIGL SERPL-MCNC: 91 MG/DL (ref 0–150)
TSH SERPL DL<=0.005 MIU/L-ACNC: 1.65 UIU/ML (ref 0.27–4.2)
URINALYSIS REFLEX: NORMAL
UROBILINOGEN UR STRIP-MCNC: 0.2 MG/DL (ref 0.2–1)
VLDLC SERPL CALC-MCNC: 17 MG/DL (ref 5–40)
WBC # BLD AUTO: 7.76 10*3/MM3 (ref 3.4–10.8)
WBC #/AREA URNS HPF: NORMAL /HPF (ref 0–5)

## 2025-01-27 ENCOUNTER — TELEPHONE (OUTPATIENT)
Dept: FAMILY MEDICINE CLINIC | Facility: CLINIC | Age: 60
End: 2025-01-27
Payer: COMMERCIAL

## 2025-01-27 NOTE — TELEPHONE ENCOUNTER
left message to call OK FOR HUB TO RELAY    Cholesterol increased from previous labs. Bad cholesterol is elevated. Decrease fats, saturated fats, meat, dairy, oils, and ensure 5 hours of exercise weekly.     Vitamin D is low- take vitamin D 5000 IU daily.     Otherwise, labs look ok. Please schedule him for follow up with me in 3 months, fasting.

## 2025-02-07 ENCOUNTER — TELEPHONE (OUTPATIENT)
Dept: SLEEP MEDICINE | Facility: HOSPITAL | Age: 60
End: 2025-02-07
Payer: COMMERCIAL

## 2025-02-07 ENCOUNTER — OFFICE VISIT (OUTPATIENT)
Dept: SLEEP MEDICINE | Facility: HOSPITAL | Age: 60
End: 2025-02-07
Payer: COMMERCIAL

## 2025-02-07 VITALS
DIASTOLIC BLOOD PRESSURE: 70 MMHG | SYSTOLIC BLOOD PRESSURE: 114 MMHG | WEIGHT: 264 LBS | HEIGHT: 72 IN | OXYGEN SATURATION: 100 % | BODY MASS INDEX: 35.76 KG/M2 | HEART RATE: 82 BPM

## 2025-02-07 DIAGNOSIS — G47.33 OBSTRUCTIVE SLEEP APNEA: Primary | ICD-10-CM

## 2025-02-07 PROCEDURE — G0463 HOSPITAL OUTPT CLINIC VISIT: HCPCS

## 2025-02-07 NOTE — PROGRESS NOTES
"[facility]  Telephone: [phone] / Fax: [phone] [place]  Telephone: [phone] / Fax: [phone] [place]    PCP: María Orozco PA    Reason for visit: DIONY f/u    Zohaib Duran is a 59 y.o.male  was last seen at [facility] sleep lab in 2020. He got auto CPAP 5-15cm H2O after seeing us for treatment of mild DIONY. His study showed mild DIONY with AHI 10.3/hr and supine AHI 11.5/hr. He has used the machine every night with good results. Wife noticed occasional episodes of snoring despite CPAP 5-15cm H2O. He wakes up with occasional dry mouth.  He is eligible to get a new machine around Oct 2025. He purchased an oximeter and has been monitoring the sats on the CPAP. There are occasional desaturations.    SH- works as , 1 tea per day.    ROS- negative.    DME- former [facility]    Current Medications:  No current outpatient medications on file.   also entered in Sleep Questionnaire    Patient  has a past medical history of Allergic, Hip bursitis, Lumbar radiculopathy, and Sleep apnea.    I have reviewed the Past Medical History, Past Surgical History, Social History and Family History.    Vital Signs /70   Pulse 82   Ht 182.9 cm (72\")   Wt 120 kg (264 lb)   SpO2 100%   BMI 35.80 kg/m²  Body mass index is 35.8 kg/m².    General Alert and oriented. No acute distress noted   Pharynx/Throat Class IV  Mallampati airway, large tongue, no evidence of redundant lateral pharyngeal tissue. No oral lesions. No thrush. Moist mucous membranes.   Head Normocephalic. Symmetrical. Atraumatic.    Nose No septal deviation. No drainage   Chest Wall Normal shape. Symmetric expansion with respiration. No tenderness.   Neck Trachea midline, no thyromegaly or adenopathy    Lungs Clear to auscultation bilaterally. No wheezes. No rhonchi. No rales. Respirations regular, even and unlabored.   Heart Regular rhythm and normal rate. Normal S1 and S2. No murmur   Abdomen Soft, non-tender and " non-distended. Normal bowel sounds. No masses.   Extremities Moves all extremities well. No edema   Psychiatric Normal mood and affect.     Testing:  Download 11/9/24-2/6/25, 100% use with average nightly use of 6 hours and 44 minutes on auto CPAP 5-15cm H2O, avg pressure 12.7cm H2O, AHI 1.7/hr.    Study-HST Oct 2020 Diagnostic findings: The patient tolerated the home sleep testing with monitoring time of 482 minutes. The data obtained make this a technically adequate study. The apnea hypopneas index(AHI) was 10.3 per sleep hour.  The AHI during supine position was 11.5 per sleep hour. Mean heart rate of 77.6 BPM.  Snoring was noted 4.1% of sleep time. Lowest oxygen saturation during the study was 81%. Saturation below 89% was noted for 28.9 mins.        Impression:  1. Obstructive sleep apnea          Plan:  Zohaib reports persistent snoring on the CPAP despite CPAP. We raised the pressures to 8-20cm H2O. He was asked to monitor sats on higher pressures and call if snoring continues to occur despite higher pressures.    Renew prescriptions for all CPAP accessories. Establish care with new DME provider.    RTC in October 2025. We will order updated device next visit.    Thank you for allowing me to participate in your patient's care.      LUZ Stewart  [facility]  Phone: [phone]      Part of this note may be an electronic transcription/translation of spoken language to printed text using the Dragon Dictation System.

## 2025-04-11 ENCOUNTER — OFFICE VISIT (OUTPATIENT)
Dept: FAMILY MEDICINE CLINIC | Facility: CLINIC | Age: 60
End: 2025-04-11
Payer: COMMERCIAL

## 2025-04-11 VITALS
HEIGHT: 72 IN | BODY MASS INDEX: 36.3 KG/M2 | HEART RATE: 83 BPM | TEMPERATURE: 98.9 F | DIASTOLIC BLOOD PRESSURE: 68 MMHG | SYSTOLIC BLOOD PRESSURE: 108 MMHG | WEIGHT: 268 LBS | RESPIRATION RATE: 18 BRPM | OXYGEN SATURATION: 96 %

## 2025-04-11 DIAGNOSIS — E55.9 VITAMIN D DEFICIENCY: ICD-10-CM

## 2025-04-11 DIAGNOSIS — E78.5 DYSLIPIDEMIA: Primary | ICD-10-CM

## 2025-04-11 DIAGNOSIS — Z12.11 ENCOUNTER FOR SCREENING FOR MALIGNANT NEOPLASM OF COLON: ICD-10-CM

## 2025-04-11 DIAGNOSIS — Z13.21 ENCOUNTER FOR VITAMIN DEFICIENCY SCREENING: ICD-10-CM

## 2025-04-11 NOTE — PROGRESS NOTES
Subjective   Zohaib Duran is a 59 y.o. male who presents today in follow-up of dyslipidemia, vitamin D deficiency, erectile dysfunction, DIONY, plantar fasciitis, back pain, neck pain, knee pain, and specialists.      History of Present Illness     Cataracts- will take out when he is 62.   Previously, he breathed in protein powder dust. Irritated and cough since yesterday but is improving. No signs of illness.  No recurrence.    Dyslipidemia- on no medications.   2020-patient is on no medication has worked on diet and exercise for significant weight loss.  He had cut out junk food but he has restarted some. He was exercising on [employer] machine- every day for 30 minutes but has stopped.   Vitamin D deficiency-advised to take vitamin D 5000 IU daily 1/2025. Taking as directed.   He was not taking for a while.   2020-he was taking vitamin D 4000 IU once daily.  Taking vitamin C. Also taking B12- unsure of dosage and taking fire cider- Turmeric and other things fermented.     Erectile dysfunction- no specialist or treatment.   2020-patient has been cleared by cardiology for Viagra and has taken without AE with medication.    DIONY- using CPAP but not seeing sleep medicine.   2020-not using CPAP- reports he has not used in a long time. Weight was 277 then came here 2018 at 241 lbs. He reports he got down to 230 lbs but has gained some weight.     Plantar fasciitis in left foot- no issues today.   He was previously seeing podiatry. [facility] ankle and foot. No injections. Had brace for night and is doing stretches in the day. Reports his foot pain resolved an dno problems.   Back pain- he slipped in the snow 2 days ago. No injuries but is sore. He continues with pain but no concerns or need for additional treatment at this time.   2022-he reported his back always bothers him but he had worsening for 2 days.  He was positive for COVID-19 infection.  Shoulder pain continues and continues with hip pain. Wife tried to make an  appt to see Dr Catalan. They would not see him without referral.     Neck pain with radiculopathy- he slipped in the snow 2 days ago. No injuries but is sore. He continues with pain but no concerns or need for additional treatment at this time.  6/2021-  He had pain from behind left ear down his left neck and pain into left trapezius muscle. He had several massages that helped but did not resolve pain. Some numbness in the left arm into middle and ring finger. No trouble walking or using hands. Not dropping things.   He also reported his left lateral hip was sore. No back pain at that time, and no pain, numbness, or tingling in leg  He fell 2 months prior - tripped up concrete step up and hit his face and shoulder. He hit his left eyebrow. No headache, dizziness, nausea, vision changes.  Cervical spine xray with OA and facet arthropathy.     Right knee pain- seen by orthopedist with xray and MRI with mild arthritis.PT helped but still has pain. Not compliant with HEP.     History of COVID-19 infection-  2022-he reported his back always bothers him but he had worsening for 2 days.  He also had pain at the bottom of his shoulder blades and cough but no SOA, nasal congestion, scratchy throat, fever for 1 night but resolved. He was feeling bad when he got up and moved around but that improved. No ear pain, V, D, loss of taste or smell.     Patient's specialists:  Cardiology-Dr. Cervantes-last appointment 8/2017 for shortness of breath, dyspnea on exertion, and abnormal EKG.  Noted to have first-degree AV block and poor R wave progression which could be body habitus and lead positioning.  Recommended stress echo and lifestyle modification.  Stress echocardiogram-LVEF 60 to 65%, mild to moderately dilated left atrium, mild mitral regurgitation, trace tricuspid regurgitation, RV systolic pressure 25 to 30 mmHg.  Negative stress echo.  Orthopedic surgery- María Story, [facility]- last appt 3/2024 for right knee injury  10/2023 moving his kids down a ramp and got his foot caught.  X-ray with mild arthritic changes.  Proceed with MRI.  MRI right knee with mild arthritic changes but no fracture or meniscus tears.  If continued pain, recommend physical therapy and anti-inflammatory or Tylenol.  2016 for right shoulder and right hip pain. Advised right shoulder AC arthritis with impingement syndrome symptoms and right hip with trochanteric bursitis.   Sleep medicine-LUZ Stewart-last appointment 10/2020 for DIONY and grinding of teeth with weight loss.  Advised CPAP.  Follow-up 6 to 8 weeks.  Patient canceled follow-up appointment and did not reschedule.      The following portions of the patient's history were reviewed and updated as appropriate: allergies, current medications, past family history, past medical history, past social history, past surgical history and problem list.    Review of Systems    Objective    Vitals:    04/11/25 0836   BP: 108/68   Pulse: 83   Resp: 18   Temp: 98.9 °F (37.2 °C)   SpO2: 96%       Body mass index is 36.34 kg/m².    Physical Exam  Vitals and nursing note reviewed.   Constitutional:       Appearance: He is well-developed.   HENT:      Head: Normocephalic and atraumatic.      Right Ear: External ear normal.      Left Ear: External ear normal.   Eyes:      Conjunctiva/sclera: Conjunctivae normal.   Neck:      Thyroid: No thyroid mass or thyromegaly.      Vascular: No carotid bruit.      Trachea: No tracheal deviation.   Cardiovascular:      Rate and Rhythm: Normal rate and regular rhythm.      Heart sounds: Normal heart sounds.   Pulmonary:      Effort: Pulmonary effort is normal.      Breath sounds: Normal breath sounds.   Skin:     General: Skin is warm and dry.   Neurological:      Mental Status: He is alert and oriented to person, place, and time.      Gait: Gait normal.   Psychiatric:         Behavior: Behavior normal.         Thought Content: Thought content normal.              Assessment & Plan   Diagnoses and all orders for this visit:    1. Dyslipidemia (Primary)  -     Comprehensive Metabolic Panel  -     CK  -     Lipid Panel With LDL / HDL Ratio    2. Vitamin D deficiency  -     Comprehensive Metabolic Panel  -     Vitamin D,25-Hydroxy    3. Encounter for screening for malignant neoplasm of colon    4. Encounter for vitamin deficiency screening  -     Vitamin D,25-Hydroxy  -     Vitamin B12 & Folate  -     CBC & Differential        Assessment and plan  Patient will have fasting labs. Call if no results in 1 week. Stability of conditions, plan, follow up, and further recommendations pending labs.    Dyslipidemia- Await repeat labs for further recommendations.  Vitamin D deficiency-  Continue vitamin D 5000 IU daily.  Dosing recommendations pending lab results.  Patient is currently taking B12 as well.  I will check levels today.  Erectile dysfunction- Consideration of cardiology follow-up with slightly abnormal echo with previous test and the need for clearance from cardiology and ophthalmology if medication is needed in the future.  DIONY- Patient is using CPAP but was not following with sleep medicine.  I referred back to sleep medicine.  I counseled him the need to follow-up with them annually.  Plantar fasciitis in left foot-  He has no complaints today.  To be seen if recurrence.   Back pain-  Patient with chronic back pain.  Consideration of physical therapy, updated imaging, or specialist follow-up if worsening, new or changing symptoms.  Neck pain with radiculopathy, shoulder pain- Consideration of physical therapy and/or updated imaging and specialist if worsening, new or changing symptoms.  Right knee pain-  Patient to restart HEP and if no improvement, consideration of returning to physical therapy or orthopedist.   History of COVID-19 infection- No concerns or residual symptoms.    From CPE 1/2025. He is due for colonoscopy and updated PSA.  I will refer for  colonoscopy and update PSA today.  He will also need to contact his insurance for coverage and location of coverage for Tdap that is due this year. He should consider annual flu shot, COVID-19 vaccination, and RSV vaccination if covered.  If he is willing to update vaccinations, we can check varicella titer to see if he is immune and would need Shingrix.  Preventative counseling to ensure healthy lifestyle-diet and exercise, updated health maintenance, and follow-up of all conditions, including sleep medicine for DIONY.    I spent 30 minutes caring for Zohaib Duran on this date of service. This time includes time spent by me in the following activities as necessary: preparing for the visit, reviewing tests, specialists records and previous visits, obtaining and/or reviewing a separately obtained history, performing a medically appropriate exam and/or evaluation, counseling and educating the patient, family, caregiver, referring and/or communicating with other healthcare professionals, documenting information in the medical record, independently interpreting results and communicating that information with the patient, family, caregiver, and developing a medically appropriate treatment plan with consideration of other conditions, medications, and treatments.

## 2025-04-12 LAB
25(OH)D3+25(OH)D2 SERPL-MCNC: 28 NG/ML (ref 30–100)
ALBUMIN SERPL-MCNC: 4.1 G/DL (ref 3.5–5.2)
ALBUMIN/GLOB SERPL: 1.5 G/DL
ALP SERPL-CCNC: 62 U/L (ref 39–117)
ALT SERPL-CCNC: 19 U/L (ref 1–41)
AST SERPL-CCNC: 16 U/L (ref 1–40)
BASOPHILS # BLD AUTO: 0.06 10*3/MM3 (ref 0–0.2)
BASOPHILS NFR BLD AUTO: 1.1 % (ref 0–1.5)
BILIRUB SERPL-MCNC: 0.5 MG/DL (ref 0–1.2)
BUN SERPL-MCNC: 13 MG/DL (ref 6–20)
BUN/CREAT SERPL: 14.8 (ref 7–25)
CALCIUM SERPL-MCNC: 9.1 MG/DL (ref 8.6–10.5)
CHLORIDE SERPL-SCNC: 105 MMOL/L (ref 98–107)
CHOLEST SERPL-MCNC: 166 MG/DL (ref 0–200)
CK SERPL-CCNC: 106 U/L (ref 20–200)
CO2 SERPL-SCNC: 27.1 MMOL/L (ref 22–29)
CREAT SERPL-MCNC: 0.88 MG/DL (ref 0.76–1.27)
EGFRCR SERPLBLD CKD-EPI 2021: 100.3 ML/MIN/1.73
EOSINOPHIL # BLD AUTO: 0.28 10*3/MM3 (ref 0–0.4)
EOSINOPHIL NFR BLD AUTO: 5 % (ref 0.3–6.2)
ERYTHROCYTE [DISTWIDTH] IN BLOOD BY AUTOMATED COUNT: 12.3 % (ref 12.3–15.4)
FOLATE SERPL-MCNC: 7.7 NG/ML (ref 4.78–24.2)
GLOBULIN SER CALC-MCNC: 2.8 GM/DL
GLUCOSE SERPL-MCNC: 97 MG/DL (ref 65–99)
HCT VFR BLD AUTO: 41.2 % (ref 37.5–51)
HDLC SERPL-MCNC: 35 MG/DL (ref 40–60)
HGB BLD-MCNC: 14.8 G/DL (ref 13–17.7)
IMM GRANULOCYTES # BLD AUTO: 0.04 10*3/MM3 (ref 0–0.05)
IMM GRANULOCYTES NFR BLD AUTO: 0.7 % (ref 0–0.5)
LDLC SERPL CALC-MCNC: 108 MG/DL (ref 0–100)
LDLC/HDLC SERPL: 3.03 {RATIO}
LYMPHOCYTES # BLD AUTO: 1.38 10*3/MM3 (ref 0.7–3.1)
LYMPHOCYTES NFR BLD AUTO: 24.4 % (ref 19.6–45.3)
MCH RBC QN AUTO: 33 PG (ref 26.6–33)
MCHC RBC AUTO-ENTMCNC: 35.9 G/DL (ref 31.5–35.7)
MCV RBC AUTO: 91.8 FL (ref 79–97)
MONOCYTES # BLD AUTO: 0.57 10*3/MM3 (ref 0.1–0.9)
MONOCYTES NFR BLD AUTO: 10.1 % (ref 5–12)
NEUTROPHILS # BLD AUTO: 3.32 10*3/MM3 (ref 1.7–7)
NEUTROPHILS NFR BLD AUTO: 58.7 % (ref 42.7–76)
NRBC BLD AUTO-RTO: 0 /100 WBC (ref 0–0.2)
PLATELET # BLD AUTO: 161 10*3/MM3 (ref 140–450)
POTASSIUM SERPL-SCNC: 4.3 MMOL/L (ref 3.5–5.2)
PROT SERPL-MCNC: 6.9 G/DL (ref 6–8.5)
RBC # BLD AUTO: 4.49 10*6/MM3 (ref 4.14–5.8)
SODIUM SERPL-SCNC: 141 MMOL/L (ref 136–145)
TRIGL SERPL-MCNC: 124 MG/DL (ref 0–150)
VIT B12 SERPL-MCNC: 619 PG/ML (ref 211–946)
VLDLC SERPL CALC-MCNC: 23 MG/DL (ref 5–40)
WBC # BLD AUTO: 5.65 10*3/MM3 (ref 3.4–10.8)

## 2025-05-08 ENCOUNTER — RESULTS FOLLOW-UP (OUTPATIENT)
Dept: FAMILY MEDICINE CLINIC | Facility: CLINIC | Age: 60
End: 2025-05-08
Payer: COMMERCIAL

## 2025-05-09 NOTE — TELEPHONE ENCOUNTER
Left message to call OK FOR HUB TO RELAY    Bad cholesterol remains elevated and good cholesterol remains low. 10 year risk of cardiovascular event is 5.5%. if he is unable to improve with diet and exercise, we will need to start medication.      Vitamin D improved but remains slightly low. Continue vitamin D 5000 IU daily, ensure no missed doses, and should further increase with summer.      Otherwise, labs look ok. Please schedule him for follow up with me in 3 months, fasting.

## 2025-07-07 ENCOUNTER — APPOINTMENT (OUTPATIENT)
Dept: CT IMAGING | Facility: HOSPITAL | Age: 60
End: 2025-07-07
Payer: COMMERCIAL

## 2025-07-07 ENCOUNTER — APPOINTMENT (OUTPATIENT)
Dept: GENERAL RADIOLOGY | Facility: HOSPITAL | Age: 60
End: 2025-07-07
Payer: COMMERCIAL

## 2025-07-07 ENCOUNTER — HOSPITAL ENCOUNTER (OUTPATIENT)
Facility: HOSPITAL | Age: 60
Setting detail: OBSERVATION
Discharge: HOME | End: 2025-07-09
Attending: EMERGENCY MEDICINE | Admitting: EMERGENCY MEDICINE
Payer: COMMERCIAL

## 2025-07-07 DIAGNOSIS — S22.42XA CLOSED FRACTURE OF MULTIPLE RIBS OF LEFT SIDE, INITIAL ENCOUNTER: ICD-10-CM

## 2025-07-07 DIAGNOSIS — K56.7 ILEUS: ICD-10-CM
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
## 2025-07-07 LAB
ALBUMIN SERPL-MCNC: 4.8 G/DL (ref 3.5–5.2)
ALBUMIN/GLOB SERPL: 1.5 G/DL
ALP SERPL-CCNC: 51 U/L (ref 39–117)
ALT SERPL W P-5'-P-CCNC: 25 U/L (ref 1–41)
ANION GAP SERPL CALCULATED.3IONS-SCNC: 12.8 MMOL/L (ref 5–15)
AST SERPL-CCNC: 18 U/L (ref 1–40)
BASOPHILS # BLD AUTO: 0.06 10*3/MM3 (ref 0–0.2)
BASOPHILS NFR BLD AUTO: 0.7 % (ref 0–1.5)
BILIRUB SERPL-MCNC: 1.2 MG/DL (ref 0–1.2)
BUN SERPL-MCNC: 14 MG/DL (ref 6–20)
BUN/CREAT SERPL: 13.6 (ref 7–25)
CALCIUM SPEC-SCNC: 9.6 MG/DL (ref 8.6–10.5)
CHLORIDE SERPL-SCNC: 104 MMOL/L (ref 98–107)
CO2 SERPL-SCNC: 25.2 MMOL/L (ref 22–29)
CREAT SERPL-MCNC: 1.03 MG/DL (ref 0.76–1.27)
DEPRECATED RDW RBC AUTO: 41.9 FL (ref 37–54)
EGFRCR SERPLBLD CKD-EPI 2021: 84.7 ML/MIN/1.73
EOSINOPHIL # BLD AUTO: 0.22 10*3/MM3 (ref 0–0.4)
EOSINOPHIL NFR BLD AUTO: 2.5 % (ref 0.3–6.2)
ERYTHROCYTE [DISTWIDTH] IN BLOOD BY AUTOMATED COUNT: 12.4 % (ref 12.3–15.4)
GLOBULIN UR ELPH-MCNC: 3.2 GM/DL
GLUCOSE SERPL-MCNC: 86 MG/DL (ref 65–99)
HCT VFR BLD AUTO: 45.1 % (ref 37.5–51)
HGB BLD-MCNC: 16 G/DL (ref 13–17.7)
HOLD SPECIMEN: NORMAL
HOLD SPECIMEN: NORMAL
IMM GRANULOCYTES # BLD AUTO: 0.03 10*3/MM3 (ref 0–0.05)
IMM GRANULOCYTES NFR BLD AUTO: 0.3 % (ref 0–0.5)
LYMPHOCYTES # BLD AUTO: 1.93 10*3/MM3 (ref 0.7–3.1)
LYMPHOCYTES NFR BLD AUTO: 21.8 % (ref 19.6–45.3)
MCH RBC QN AUTO: 33.3 PG (ref 26.6–33)
MCHC RBC AUTO-ENTMCNC: 35.5 G/DL (ref 31.5–35.7)
MCV RBC AUTO: 93.8 FL (ref 79–97)
MONOCYTES # BLD AUTO: 1.02 10*3/MM3 (ref 0.1–0.9)
MONOCYTES NFR BLD AUTO: 11.5 % (ref 5–12)
NEUTROPHILS NFR BLD AUTO: 5.58 10*3/MM3 (ref 1.7–7)
NEUTROPHILS NFR BLD AUTO: 63.2 % (ref 42.7–76)
NRBC BLD AUTO-RTO: 0 /100 WBC (ref 0–0.2)
NT-PROBNP SERPL-MCNC: <36 PG/ML (ref 0–900)
PLATELET # BLD AUTO: 185 10*3/MM3 (ref 140–450)
PMV BLD AUTO: 9 FL (ref 6–12)
POTASSIUM SERPL-SCNC: 4.3 MMOL/L (ref 3.5–5.2)
PROT SERPL-MCNC: 8 G/DL (ref 6–8.5)
RBC # BLD AUTO: 4.81 10*6/MM3 (ref 4.14–5.8)
SODIUM SERPL-SCNC: 142 MMOL/L (ref 136–145)
TROPONIN T SERPL HS-MCNC: 9 NG/L
WBC NRBC COR # BLD AUTO: 8.84 10*3/MM3 (ref 3.4–10.8)
WHOLE BLOOD HOLD COAG: NORMAL
WHOLE BLOOD HOLD SPECIMEN: NORMAL

## 2025-07-07 PROCEDURE — 85025 COMPLETE CBC W/AUTO DIFF WBC: CPT

## 2025-07-07 PROCEDURE — 25510000001 IOPAMIDOL 61 % SOLUTION: Performed by: EMERGENCY MEDICINE

## 2025-07-07 PROCEDURE — 74177 CT ABD & PELVIS W/CONTRAST: CPT

## 2025-07-07 PROCEDURE — 71045 X-RAY EXAM CHEST 1 VIEW: CPT

## 2025-07-07 PROCEDURE — 71260 CT THORAX DX C+: CPT

## 2025-07-07 PROCEDURE — 99285 EMERGENCY DEPT VISIT HI MDM: CPT

## 2025-07-07 PROCEDURE — 93010 ELECTROCARDIOGRAM REPORT: CPT | Performed by: INTERNAL MEDICINE

## 2025-07-07 PROCEDURE — 36415 COLL VENOUS BLD VENIPUNCTURE: CPT

## 2025-07-07 PROCEDURE — 80053 COMPREHEN METABOLIC PANEL: CPT | Performed by: EMERGENCY MEDICINE

## 2025-07-07 PROCEDURE — 93005 ELECTROCARDIOGRAM TRACING: CPT | Performed by: EMERGENCY MEDICINE

## 2025-07-07 PROCEDURE — 93005 ELECTROCARDIOGRAM TRACING: CPT

## 2025-07-07 PROCEDURE — 83880 ASSAY OF NATRIURETIC PEPTIDE: CPT | Performed by: EMERGENCY MEDICINE

## 2025-07-07 PROCEDURE — 84484 ASSAY OF TROPONIN QUANT: CPT | Performed by: EMERGENCY MEDICINE

## 2025-07-07 RX ORDER — IOPAMIDOL 612 MG/ML
85 INJECTION, SOLUTION INTRAVASCULAR
Status: COMPLETED | OUTPATIENT
Start: 2025-07-07 | End: 2025-07-07

## 2025-07-07 RX ORDER — SODIUM CHLORIDE 0.9 % (FLUSH) 0.9 %
10 SYRINGE (ML) INJECTION AS NEEDED
Status: DISCONTINUED | OUTPATIENT
Start: 2025-07-07 | End: 2025-07-09 | Stop reason: HOSPADM

## 2025-07-07 RX ORDER — LIDOCAINE 4 G/G
1 PATCH TOPICAL ONCE
Status: COMPLETED | OUTPATIENT
Start: 2025-07-08 | End: 2025-07-08

## 2025-07-07 RX ADMIN — IOPAMIDOL 85 ML: 612 INJECTION, SOLUTION INTRAVENOUS at 21:49

## 2025-07-07 NOTE — ED NOTES
Pt arrived via pv from home w/ c/o slipping in mud falling & hitting his L ribs & smashing a 5 gallon bucket on 7/1. Pt reports now he is unable to tolerate liquids or solids w/o his stomach becoming distended. Pt also reports L shoulder pain when he eats as well.     Pt reports SOB w/ increased pain & exertion.   Patient is a 87y old  Female who presents with a chief complaint of agitation (25 Jan 2018 23:23)    SUBJECTIVE / OVERNIGHT EVENTS:  Patient with no complaints. No Nausea.     MEDICATIONS  (STANDING):  aspirin enteric coated 81 milliGRAM(s) Oral daily  busPIRone 15 milliGRAM(s) Oral <User Schedule>  dextrose 50% Injectable 12.5 Gram(s) IV Push once  dextrose 50% Injectable 25 Gram(s) IV Push once  dextrose 50% Injectable 25 Gram(s) IV Push once  heparin  Injectable 5000 Unit(s) SubCutaneous every 8 hours  insulin lispro (HumaLOG) corrective regimen sliding scale   SubCutaneous three times a day before meals  insulin lispro (HumaLOG) corrective regimen sliding scale   SubCutaneous at bedtime  loratadine 10 milliGRAM(s) Oral daily  melatonin 3 milliGRAM(s) Oral at bedtime  metoprolol succinate ER 25 milliGRAM(s) Oral daily  nitrofurantoin (MACROBID) 100 milliGRAM(s) Oral two times a day with meals  pantoprazole    Tablet 40 milliGRAM(s) Oral before breakfast  QUEtiapine 12.5 milliGRAM(s) Oral <User Schedule>  QUEtiapine 25 milliGRAM(s) Oral <User Schedule>    MEDICATIONS  (PRN):  acetaminophen   Tablet. 650 milliGRAM(s) Oral every 6 hours PRN Mild Pain (1 - 3)  dextrose Gel 1 Dose(s) Oral once PRN Blood Glucose LESS THAN 70 milliGRAM(s)/deciliter  glucagon  Injectable 1 milliGRAM(s) IntraMuscular once PRN Glucose LESS THAN 70 milligrams/deciliter    Vital Signs Last 24 Hrs  T(C): 36.5 (29 Jan 2018 13:41), Max: 36.6 (28 Jan 2018 19:08)  T(F): 97.7 (29 Jan 2018 13:41), Max: 97.9 (28 Jan 2018 19:08)  HR: 59 (29 Jan 2018 13:41) (58 - 64)  BP: 136/58 (29 Jan 2018 13:41) (123/51 - 136/58)  BP(mean): --  RR: 18 (29 Jan 2018 13:41) (18 - 18)  SpO2: 98% (29 Jan 2018 13:41) (97% - 99%)    PHYSICAL EXAM  GENERAL: Elderly woman in NAD, well-developed  HEART: Regular rate and rhythm; Grade 3/6 systolic murmur heard throughout precordium. No rubs, or gallops  ABDOMEN: Soft, Nontender, Nondistended; Bowel sounds present.    LABS:        Consultant(s) Notes Reviewed    Care Discussed with Consultants/Other Providers:

## 2025-07-08 ENCOUNTER — APPOINTMENT (OUTPATIENT)
Dept: GENERAL RADIOLOGY | Facility: HOSPITAL | Age: 60
End: 2025-07-08
Payer: COMMERCIAL

## 2025-07-08 PROBLEM — K56.7 ILEUS: Status: ACTIVE | Noted: 2025-07-08

## 2025-07-08 LAB
ADV 40+41 DNA STL QL NAA+NON-PROBE: NOT DETECTED
ALBUMIN SERPL-MCNC: 4.1 G/DL (ref 3.5–5.2)
ALBUMIN/GLOB SERPL: 1.3 G/DL
ALP SERPL-CCNC: 46 U/L (ref 39–117)
ALT SERPL W P-5'-P-CCNC: 23 U/L (ref 1–41)
ANION GAP SERPL CALCULATED.3IONS-SCNC: 13 MMOL/L (ref 5–15)
AST SERPL-CCNC: 21 U/L (ref 1–40)
ASTRO TYP 1-8 RNA STL QL NAA+NON-PROBE: NOT DETECTED
BILIRUB SERPL-MCNC: 1.2 MG/DL (ref 0–1.2)
BUN SERPL-MCNC: 13 MG/DL (ref 6–20)
BUN/CREAT SERPL: 15.3 (ref 7–25)
C CAYETANENSIS DNA STL QL NAA+NON-PROBE: NOT DETECTED
C COLI+JEJ+UPSA DNA STL QL NAA+NON-PROBE: NOT DETECTED
CALCIUM SPEC-SCNC: 8.8 MG/DL (ref 8.6–10.5)
CHLORIDE SERPL-SCNC: 104 MMOL/L (ref 98–107)
CO2 SERPL-SCNC: 21 MMOL/L (ref 22–29)
CREAT SERPL-MCNC: 0.85 MG/DL (ref 0.76–1.27)
CRYPTOSP DNA STL QL NAA+NON-PROBE: NOT DETECTED
DEPRECATED RDW RBC AUTO: 44.4 FL (ref 37–54)
E HISTOLYT DNA STL QL NAA+NON-PROBE: NOT DETECTED
EAEC PAA PLAS AGGR+AATA ST NAA+NON-PRB: NOT DETECTED
EC STX1+STX2 GENES STL QL NAA+NON-PROBE: NOT DETECTED
EGFRCR SERPLBLD CKD-EPI 2021: 101.4 ML/MIN/1.73
EPEC EAE GENE STL QL NAA+NON-PROBE: NOT DETECTED
ERYTHROCYTE [DISTWIDTH] IN BLOOD BY AUTOMATED COUNT: 12.6 % (ref 12.3–15.4)
ETEC LTA+ST1A+ST1B TOX ST NAA+NON-PROBE: NOT DETECTED
G LAMBLIA DNA STL QL NAA+NON-PROBE: NOT DETECTED
GLOBULIN UR ELPH-MCNC: 3.1 GM/DL
GLUCOSE SERPL-MCNC: 92 MG/DL (ref 65–99)
HCT VFR BLD AUTO: 44.4 % (ref 37.5–51)
HGB BLD-MCNC: 14.9 G/DL (ref 13–17.7)
MCH RBC QN AUTO: 32.5 PG (ref 26.6–33)
MCHC RBC AUTO-ENTMCNC: 33.6 G/DL (ref 31.5–35.7)
MCV RBC AUTO: 96.9 FL (ref 79–97)
NOROVIRUS GI+II RNA STL QL NAA+NON-PROBE: NOT DETECTED
P SHIGELLOIDES DNA STL QL NAA+NON-PROBE: NOT DETECTED
PLATELET # BLD AUTO: 165 10*3/MM3 (ref 140–450)
PMV BLD AUTO: 9.2 FL (ref 6–12)
POTASSIUM SERPL-SCNC: 4 MMOL/L (ref 3.5–5.2)
PROT SERPL-MCNC: 7.2 G/DL (ref 6–8.5)
RBC # BLD AUTO: 4.58 10*6/MM3 (ref 4.14–5.8)
RVA RNA STL QL NAA+NON-PROBE: NOT DETECTED
S ENT+BONG DNA STL QL NAA+NON-PROBE: NOT DETECTED
SAPO I+II+IV+V RNA STL QL NAA+NON-PROBE: NOT DETECTED
SHIGELLA SP+EIEC IPAH ST NAA+NON-PROBE: NOT DETECTED
SODIUM SERPL-SCNC: 138 MMOL/L (ref 136–145)
V CHOL+PARA+VUL DNA STL QL NAA+NON-PROBE: NOT DETECTED
V CHOLERAE DNA STL QL NAA+NON-PROBE: NOT DETECTED
WBC NRBC COR # BLD AUTO: 8.15 10*3/MM3 (ref 3.4–10.8)
Y ENTEROCOL DNA STL QL NAA+NON-PROBE: NOT DETECTED

## 2025-07-08 PROCEDURE — 74248 X-RAY SM INT F-THRU STD: CPT

## 2025-07-08 PROCEDURE — 25010000002 METOCLOPRAMIDE PER 10 MG: Performed by: STUDENT IN AN ORGANIZED HEALTH CARE EDUCATION/TRAINING PROGRAM

## 2025-07-08 PROCEDURE — 96374 THER/PROPH/DIAG INJ IV PUSH: CPT

## 2025-07-08 PROCEDURE — G0378 HOSPITAL OBSERVATION PER HR: HCPCS

## 2025-07-08 PROCEDURE — 80053 COMPREHEN METABOLIC PANEL: CPT | Performed by: STUDENT IN AN ORGANIZED HEALTH CARE EDUCATION/TRAINING PROGRAM

## 2025-07-08 PROCEDURE — 85027 COMPLETE CBC AUTOMATED: CPT | Performed by: STUDENT IN AN ORGANIZED HEALTH CARE EDUCATION/TRAINING PROGRAM

## 2025-07-08 PROCEDURE — 74240 X-RAY XM UPR GI TRC 1CNTRST: CPT

## 2025-07-08 PROCEDURE — 87507 IADNA-DNA/RNA PROBE TQ 12-25: CPT | Performed by: NURSE PRACTITIONER

## 2025-07-08 PROCEDURE — 25810000003 LACTATED RINGERS PER 1000 ML: Performed by: STUDENT IN AN ORGANIZED HEALTH CARE EDUCATION/TRAINING PROGRAM

## 2025-07-08 RX ORDER — ACETAMINOPHEN 160 MG/5ML
650 SOLUTION ORAL EVERY 4 HOURS PRN
Status: DISCONTINUED | OUTPATIENT
Start: 2025-07-08 | End: 2025-07-09 | Stop reason: HOSPADM

## 2025-07-08 RX ORDER — METOCLOPRAMIDE HYDROCHLORIDE 5 MG/ML
10 INJECTION INTRAMUSCULAR; INTRAVENOUS ONCE
Status: COMPLETED | OUTPATIENT
Start: 2025-07-08 | End: 2025-07-08

## 2025-07-08 RX ORDER — LIDOCAINE 4 G/G
1 PATCH TOPICAL
Status: DISCONTINUED | OUTPATIENT
Start: 2025-07-08 | End: 2025-07-09 | Stop reason: HOSPADM

## 2025-07-08 RX ORDER — SODIUM CHLORIDE, SODIUM LACTATE, POTASSIUM CHLORIDE, CALCIUM CHLORIDE 600; 310; 30; 20 MG/100ML; MG/100ML; MG/100ML; MG/100ML
125 INJECTION, SOLUTION INTRAVENOUS CONTINUOUS
Status: ACTIVE | OUTPATIENT
Start: 2025-07-08 | End: 2025-07-08

## 2025-07-08 RX ORDER — SODIUM CHLORIDE 0.9 % (FLUSH) 0.9 %
10 SYRINGE (ML) INJECTION AS NEEDED
Status: DISCONTINUED | OUTPATIENT
Start: 2025-07-08 | End: 2025-07-09 | Stop reason: HOSPADM

## 2025-07-08 RX ORDER — SODIUM CHLORIDE 9 MG/ML
40 INJECTION, SOLUTION INTRAVENOUS AS NEEDED
Status: DISCONTINUED | OUTPATIENT
Start: 2025-07-08 | End: 2025-07-09 | Stop reason: HOSPADM

## 2025-07-08 RX ORDER — ACETAMINOPHEN 650 MG/1
650 SUPPOSITORY RECTAL EVERY 4 HOURS PRN
Status: DISCONTINUED | OUTPATIENT
Start: 2025-07-08 | End: 2025-07-09 | Stop reason: HOSPADM

## 2025-07-08 RX ORDER — ONDANSETRON 2 MG/ML
4 INJECTION INTRAMUSCULAR; INTRAVENOUS EVERY 6 HOURS PRN
Status: DISCONTINUED | OUTPATIENT
Start: 2025-07-08 | End: 2025-07-09 | Stop reason: HOSPADM

## 2025-07-08 RX ORDER — MULTIVIT WITH MINERALS/LUTEIN
250 TABLET ORAL DAILY
COMMUNITY

## 2025-07-08 RX ORDER — ACETAMINOPHEN 325 MG/1
650 TABLET ORAL EVERY 4 HOURS PRN
Status: DISCONTINUED | OUTPATIENT
Start: 2025-07-08 | End: 2025-07-09 | Stop reason: HOSPADM

## 2025-07-08 RX ORDER — SODIUM CHLORIDE 0.9 % (FLUSH) 0.9 %
10 SYRINGE (ML) INJECTION EVERY 12 HOURS SCHEDULED
Status: DISCONTINUED | OUTPATIENT
Start: 2025-07-08 | End: 2025-07-09 | Stop reason: HOSPADM

## 2025-07-08 RX ORDER — ONDANSETRON 4 MG/1
4 TABLET, ORALLY DISINTEGRATING ORAL EVERY 6 HOURS PRN
Status: DISCONTINUED | OUTPATIENT
Start: 2025-07-08 | End: 2025-07-09 | Stop reason: HOSPADM

## 2025-07-08 RX ADMIN — Medication 10 ML: at 10:10

## 2025-07-08 RX ADMIN — ACETAMINOPHEN 650 MG: 325 TABLET, FILM COATED ORAL at 15:48

## 2025-07-08 RX ADMIN — BARIUM SULFATE 366 ML: 960 POWDER, FOR SUSPENSION ORAL at 12:50

## 2025-07-08 RX ADMIN — LIDOCAINE 1 PATCH: 4 PATCH TOPICAL at 00:28

## 2025-07-08 RX ADMIN — BARIUM SULFATE 135 ML: 980 POWDER, FOR SUSPENSION ORAL at 12:51

## 2025-07-08 RX ADMIN — ACETAMINOPHEN 650 MG: 325 TABLET, FILM COATED ORAL at 01:46

## 2025-07-08 RX ADMIN — Medication 10 ML: at 20:03

## 2025-07-08 RX ADMIN — BARIUM SULFATE 700 MG: 700 TABLET ORAL at 12:50

## 2025-07-08 RX ADMIN — LIDOCAINE 1 PATCH: 4 PATCH TOPICAL at 15:48

## 2025-07-08 RX ADMIN — METOCLOPRAMIDE 10 MG: 5 INJECTION, SOLUTION INTRAMUSCULAR; INTRAVENOUS at 01:45

## 2025-07-08 RX ADMIN — Medication 10 ML: at 01:47

## 2025-07-08 RX ADMIN — ANTACID/ANTIFLATULENT 1 PACKET: 380; 550; 10; 10 GRANULE, EFFERVESCENT ORAL at 12:51

## 2025-07-08 RX ADMIN — SODIUM CHLORIDE, POTASSIUM CHLORIDE, SODIUM LACTATE AND CALCIUM CHLORIDE 125 ML/HR: 600; 310; 30; 20 INJECTION, SOLUTION INTRAVENOUS at 01:46

## 2025-07-08 NOTE — ED PROVIDER NOTES
EMERGENCY DEPARTMENT ENCOUNTER    Room Number:  19/19  PCP: María Orozco PA  Historian: Patient      HPI:  Chief Complaint: Fall with rib pain  A complete HPI/ROS/PMH/PSH/SH/FH are unobtainable due to: None  Context: Zohaib Duran is a 59 y.o. male who presents to the ED c/o sudden onset pain to the left lower chest wall as well as the left upper abdomen after falling on a bucket several days ago.  He reports that the pain has become fairly severe in intensity and made worse by deep inspiration as well as touch and movements.  He states that the pain is intensified over the last several days.  He reports that now he is having associated abdominal discomfort as well as nausea and vomiting with bloating with each time he eats.  He denies any hemoptysis, fever/chills, back pain, shortness of breath, or known sick contacts.            PAST MEDICAL HISTORY  Active Ambulatory Problems     Diagnosis Date Noted    Bursitis of hip 06/20/2017    Fatigue 06/20/2017    Lumbar radiculopathy 06/20/2017    Obstructive sleep apnea 06/20/2017    Morbid obesity 06/20/2017    Dyslipidemia 06/13/2018    Vitamin D deficiency 06/13/2018    TBI (traumatic brain injury) 06/13/2018    Erectile dysfunction 06/13/2018    Plantar fasciitis of left foot 01/10/2025    Neck pain 01/10/2025    Cervical radiculopathy 01/10/2025     Resolved Ambulatory Problems     Diagnosis Date Noted    No Resolved Ambulatory Problems     Past Medical History:   Diagnosis Date    Allergic     Hip bursitis     Sleep apnea          PAST SURGICAL HISTORY  Past Surgical History:   Procedure Laterality Date    COLONOSCOPY  2008    hemorrhoids    HERNIA REPAIR Left 10/27/2010    Open left inguinal hernia repair w/ mesh-Dr. Fito Bloom    TUMOR REMOVAL Left 2000    thigh         FAMILY HISTORY  Family History   Problem Relation Age of Onset    Glaucoma Mother     Kidney disease Other         STONES         SOCIAL HISTORY  Social History     Socioeconomic  History    Marital status:    Tobacco Use    Smoking status: Former     Current packs/day: 0.00     Average packs/day: 1 pack/day for 4.0 years (4.0 ttl pk-yrs)     Types: Cigarettes     Quit date: 1992     Years since quittin.5    Smokeless tobacco: Never   Vaping Use    Vaping status: Never Used   Substance and Sexual Activity    Alcohol use: Yes     Comment: occasionally    Drug use: No    Sexual activity: Yes     Partners: Female     Birth control/protection: Vasectomy, Surgical         ALLERGIES  Sulfa antibiotics        REVIEW OF SYSTEMS  Review of Systems   Constitutional:  Negative for activity change, appetite change and fever.   HENT:  Negative for congestion and sore throat.    Eyes: Negative.    Respiratory:  Negative for cough and shortness of breath.    Cardiovascular:  Negative for chest pain and leg swelling.   Gastrointestinal:  Positive for abdominal pain, nausea and vomiting. Negative for diarrhea.   Endocrine: Negative.    Genitourinary:  Negative for decreased urine volume and dysuria.   Musculoskeletal:  Negative for neck pain.        Chest wall/rib pain   Skin:  Negative for rash and wound.   Allergic/Immunologic: Negative.    Neurological:  Negative for weakness, numbness and headaches.   Hematological: Negative.    Psychiatric/Behavioral: Negative.     All other systems reviewed and are negative.         PHYSICAL EXAM  ED Triage Vitals   Temp Heart Rate Resp BP SpO2   25   98.9 °F (37.2 °C) 101 20 158/96 97 %      Temp src Heart Rate Source Patient Position BP Location FiO2 (%)   -- -- 25 --     Sitting Right arm        Physical Exam  Constitutional:       General: He is not in acute distress.     Appearance: Normal appearance. He is not ill-appearing or toxic-appearing.   HENT:      Head: Normocephalic and atraumatic.   Eyes:      Extraocular Movements: Extraocular movements intact.       Pupils: Pupils are equal, round, and reactive to light.   Cardiovascular:      Rate and Rhythm: Normal rate and regular rhythm.      Heart sounds: No murmur heard.     No friction rub. No gallop.   Pulmonary:      Effort: Pulmonary effort is normal.      Breath sounds: Normal breath sounds.   Abdominal:      General: Abdomen is flat. There is no distension.      Palpations: Abdomen is soft.      Tenderness: There is abdominal tenderness in the left upper quadrant.   Musculoskeletal:         General: No swelling or tenderness. Normal range of motion.      Cervical back: Normal range of motion and neck supple.      Comments: Tenderness to palpation to the left lower rib/mid axillary region without palpable fracture or crepitus   Skin:     General: Skin is warm and dry.   Neurological:      General: No focal deficit present.      Mental Status: He is alert and oriented to person, place, and time.      Sensory: No sensory deficit.      Motor: No weakness.   Psychiatric:         Mood and Affect: Mood normal.         Behavior: Behavior normal.           Vital signs and nursing notes reviewed.          LAB RESULTS  Recent Results (from the past 24 hours)   ECG 12 Lead ED Triage Standing Order; SOA    Collection Time: 07/07/25  8:07 PM   Result Value Ref Range    QT Interval 336 ms    QTC Interval 409 ms   Comprehensive Metabolic Panel    Collection Time: 07/07/25  8:08 PM    Specimen: Arm, Right; Blood   Result Value Ref Range    Glucose 86 65 - 99 mg/dL    BUN 14.0 6.0 - 20.0 mg/dL    Creatinine 1.03 0.76 - 1.27 mg/dL    Sodium 142 136 - 145 mmol/L    Potassium 4.3 3.5 - 5.2 mmol/L    Chloride 104 98 - 107 mmol/L    CO2 25.2 22.0 - 29.0 mmol/L    Calcium 9.6 8.6 - 10.5 mg/dL    Total Protein 8.0 6.0 - 8.5 g/dL    Albumin 4.8 3.5 - 5.2 g/dL    ALT (SGPT) 25 1 - 41 U/L    AST (SGOT) 18 1 - 40 U/L    Alkaline Phosphatase 51 39 - 117 U/L    Total Bilirubin 1.2 0.0 - 1.2 mg/dL    Globulin 3.2 gm/dL    A/G Ratio 1.5 g/dL     BUN/Creatinine Ratio 13.6 7.0 - 25.0    Anion Gap 12.8 5.0 - 15.0 mmol/L    eGFR 84.7 >60.0 mL/min/1.73   BNP    Collection Time: 07/07/25  8:08 PM    Specimen: Arm, Right; Blood   Result Value Ref Range    proBNP <36.0 0.0 - 900.0 pg/mL   High Sensitivity Troponin T    Collection Time: 07/07/25  8:08 PM    Specimen: Arm, Right; Blood   Result Value Ref Range    HS Troponin T 9 <22 ng/L   Green Top (Gel)    Collection Time: 07/07/25  8:08 PM   Result Value Ref Range    Extra Tube Hold for add-ons.    Lavender Top    Collection Time: 07/07/25  8:08 PM   Result Value Ref Range    Extra Tube hold for add-on    Gold Top - SST    Collection Time: 07/07/25  8:08 PM   Result Value Ref Range    Extra Tube Hold for add-ons.    Light Blue Top    Collection Time: 07/07/25  8:08 PM   Result Value Ref Range    Extra Tube Hold for add-ons.    CBC Auto Differential    Collection Time: 07/07/25  8:08 PM    Specimen: Arm, Right; Blood   Result Value Ref Range    WBC 8.84 3.40 - 10.80 10*3/mm3    RBC 4.81 4.14 - 5.80 10*6/mm3    Hemoglobin 16.0 13.0 - 17.7 g/dL    Hematocrit 45.1 37.5 - 51.0 %    MCV 93.8 79.0 - 97.0 fL    MCH 33.3 (H) 26.6 - 33.0 pg    MCHC 35.5 31.5 - 35.7 g/dL    RDW 12.4 12.3 - 15.4 %    RDW-SD 41.9 37.0 - 54.0 fl    MPV 9.0 6.0 - 12.0 fL    Platelets 185 140 - 450 10*3/mm3    Neutrophil % 63.2 42.7 - 76.0 %    Lymphocyte % 21.8 19.6 - 45.3 %    Monocyte % 11.5 5.0 - 12.0 %    Eosinophil % 2.5 0.3 - 6.2 %    Basophil % 0.7 0.0 - 1.5 %    Immature Grans % 0.3 0.0 - 0.5 %    Neutrophils, Absolute 5.58 1.70 - 7.00 10*3/mm3    Lymphocytes, Absolute 1.93 0.70 - 3.10 10*3/mm3    Monocytes, Absolute 1.02 (H) 0.10 - 0.90 10*3/mm3    Eosinophils, Absolute 0.22 0.00 - 0.40 10*3/mm3    Basophils, Absolute 0.06 0.00 - 0.20 10*3/mm3    Immature Grans, Absolute 0.03 0.00 - 0.05 10*3/mm3    nRBC 0.0 0.0 - 0.2 /100 WBC       Ordered the above labs and reviewed the results.        RADIOLOGY  CT Chest With Contrast  Diagnostic  CT Chest With Contrast Diagnostic, CT Abdomen Pelvis With Contrast  Result Date: 7/7/2025  CT OF THE CHEST WITH CONTRAST; CT OF THE ABDOMEN PELVIS WITH CONTRAST  HISTORY: Fall  COMPARISON: None available.  TECHNIQUE: Axial CT imaging was obtained from the thoracic inlet through the symphysis pubis. IV contrast was administered.  FINDINGS: Chest: There may be nondisplaced fractures of the left lateral seventh and eighth ribs. No thoracic vertebral fractures are seen. No pneumothorax is seen. The patient does have atelectasis within both lower lobes, left greater than right. The thyroid gland, trachea, and esophagus appear unremarkable. Thoracic aorta is normal in caliber. No dissection is seen. No coronary artery calcifications are identified. Mediastinal lymph nodes do not appear pathologically enlarged.  Abdomen/pelvis: No fractures are seen. No focal hepatic lesions are seen. The stomach adrenal glands, spleen, pancreas, and gallbladder are normal. The kidneys enhance symmetrically. No hydronephrosis is seen. The patient's duodenum and proximal jejunum appears somewhat patulous and fluid-filled. Mild enteritis and/or ileus is not excluded the kidneys enhance symmetrically. No hydronephrosis is seen. Prostate gland contains dystrophic calcifications. Urinary bladder is normal. Abdominal aorta is normal in caliber, and without evidence of obstruction. There is bowel obstruction. The appendix is normal.       1. Potential nondisplaced fractures of the left lateral seventh and eighth ribs. 2. Patulous appearance to the duodenum and proximal jejunum. This may reflect enteritis and/or ileus.  Radiation dose reduction techniques were utilized, including automated exposure control and exposure modulation based on body size.   This report was finalized on 7/7/2025 11:00 PM by Dr. Yessenia Hensley M.D on Workstation: [id number]      XR Chest 1 View  Result Date: 7/7/2025  AP CHEST  HISTORY: Shortness of  breath  COMPARISON: None available  FINDINGS: There are bands of atelectasis or scarring in the lung bases. Heart size normal. No evidence of pneumothorax. No acute bony abnormality      Bands of atelectasis or scarring in the lung bases    This report was finalized on 7/7/2025 8:51 PM by Dr. Nguyễn Marsh M.D on Workstation: [id number]        Ordered the above noted radiological studies. Reviewed by me in PACS.            PROCEDURES  Procedures          MEDICATIONS GIVEN IN ER  Medications   sodium chloride 0.9 % flush 10 mL (has no administration in time range)   Lidocaine 4 % 1 patch (has no administration in time range)   iopamidol (ISOVUE-300) 61 % injection 85 mL (85 mL Intravenous Given by Other 7/7/25 1519)                   MEDICAL DECISION MAKING, PROGRESS, and CONSULTS    All labs have been independently reviewed by me.  All radiology studies have been reviewed by me and I have also reviewed the radiology report.   EKGs independently viewed and interpreted by me.  Discussion below represents my analysis of pertinent findings related to patient's condition, differential diagnosis, treatment plan and final disposition.      Additional sources:  - Discussed/ obtained information from independent historians: History obtained from the patient as well as the patient's spouse at bedside.    - External (non-ED) record review: Upon medical records review, the patient was last seen and evaluated the outpatient office at [facility] on 4/11/2025 in follow-up for his known dyslipidemia.    - Chronic or social conditions impacting care: None reported    - Shared decision making: Admission decision based on shared conversations have between myself, the patient and family at bedside, as well as Juli Castillo PA-C.      Orders placed during this visit:  Orders Placed This Encounter   Procedures    XR Chest 1 View    CT Chest With Contrast Diagnostic    CT Abdomen Pelvis With Contrast    [place] Draw     Comprehensive Metabolic Panel    BNP    High Sensitivity Troponin T    CBC Auto Differential    NPO Diet NPO Type: Strict NPO    Undress & Gown    Continuous Pulse Oximetry    Vital Signs    Oxygen Therapy- Nasal Cannula; Titrate 1-6 LPM Per SpO2; 90 - 95%    ECG 12 Lead ED Triage Standing Order; SOA    Insert Peripheral IV    Initiate Emergency Department Observation Status    CBC & Differential    Green Top (Gel)    Lavender Top    Gold Top - SST    Light Blue Top           Differential diagnosis includes but is not limited to:    Rib fracture, hemothorax, pneumothorax, pleural effusion, splenic laceration, intraperitoneal free air, pneumonia, or bowel obstruction      Independent interpretation of labs, radiology studies, and discussions with consultants:    Chest x-ray independently interpreted by myself with my interpretation showing no cardiomegaly nor evidence of obvious rib fracture or pneumothorax.      ED Course as of 07/08/25 0022 Tue Jul 08, 2025   0002 On reevaluation, the patient is resting comfortably but continues to report that he is having some discomfort with movement in his left lateral chest wall.  I informed him that he has likely to nondisplaced left-sided rib fractures as well as a potential ileus seen on abdominal CT.  He and his family are very concerned about the abdominal issues that he is having so I have recommended that we admit him to the observation unit for pain control as well as potential GI assessment.  They agree with the plan and all questions answered. [BM]   0022 The patient's presentation, workup, as well as diagnosis and treatment plan was discussed at length with Juli Castillo PA-C.  She agrees to admit the patient to the observation unit today with Dr. Espinoza. [BM]      ED Course User Index  [BM] Jeffery Lobato MD           DIAGNOSIS  Final diagnoses:   Fall from standing, initial encounter   Closed fracture of multiple ribs of left side, initial encounter    Ileus         DISPOSITION  ADMISSION    Discussed treatment plan and reason for admission with pt/family and admitting physician.  Pt/family voiced understanding of the plan for admission for further testing/treatment as needed.               Latest Documented Vital Signs:  As of 00:22 EDT  BP- 131/91 HR- 83 Temp- 98.9 °F (37.2 °C) O2 sat- 97%              --    Please note that portions of this were completed with a voice recognition program.       Note Disclaimer: At [facility], we believe that sharing information builds trust and better relationships. You are receiving this note because you are receiving care at [facility] or recently visited. It is possible you will see health information before a provider has talked with you about it. This kind of information can be easy to misunderstand. To help you fully understand what it means for your health, we urge you to discuss this note with your provider.             Jeffery Lobato MD  07/08/25 0022

## 2025-07-08 NOTE — PLAN OF CARE
Goal Outcome Evaluation:  Plan of Care Reviewed With: patient        Progress: improving  Outcome Evaluation: Pt still experiencing slight abdominal discomfort bowel sounds active and patient passing gas, no nausea or vomiting, abdomen still slighlty distended and firm, no new complaints of pain or chest pain, pt has rib fractures but no signs of respiratory distress, vital signs stable, will continue to monitor.

## 2025-07-08 NOTE — ED NOTES
Nursing report ED to floor  Zohaib Duran  59 y.o.  male    HPI :  HPI  Stated Reason for Visit: Pt arrived via pv from home w/ c/o slipping in mud falling & hitting his L ribs & smashing a 5 gallon bucket on 7/1. Pt reports now he is unable to tolerate liquids or solids w/o his stomach becoming distended. Pt also reports L shoulder pain when he eats as well.      Pt reports SOB w/ increased pain & exertion.  History Obtained From: patient    Chief Complaint  Chief Complaint   Patient presents with    Rib Pain    Shoulder Pain    Fall       Admitting doctor:   Shaquille Espinoza MD    Admitting diagnosis:   The primary encounter diagnosis was Fall from standing, initial encounter. Diagnoses of Closed fracture of multiple ribs of left side, initial encounter and Ileus were also pertinent to this visit.    Code status:   Current Code Status       Date Active Code Status Order ID Comments User Context       Not on file            Allergies:   Sulfa antibiotics    Isolation:   No active isolations    Intake and Output  No intake or output data in the 24 hours ending 07/08/25 0025    Weight:       07/07/25 2001   Weight: 120 kg (265 lb)       Most recent vitals:   Vitals:    07/07/25 2130 07/07/25 2230 07/07/25 2300 07/07/25 2330   BP: 132/93 135/90 131/88 131/91   BP Location:       Patient Position:       Pulse: 89 85 87 83   Resp:       Temp:       SpO2: 95% 97% 100% 97%   Weight:       Height:           Active LDAs/IV Access:   Lines, Drains & Airways       Active LDAs       Name Placement date Placement time Site Days    Peripheral IV 07/07/25 2122 20 G Right Antecubital 07/07/25 2122  Antecubital  less than 1                    Labs (abnormal labs have a star):   Labs Reviewed   CBC WITH AUTO DIFFERENTIAL - Abnormal; Notable for the following components:       Result Value    MCH 33.3 (*)     Monocytes, Absolute 1.02 (*)     All other components within normal limits   BNP (IN-HOUSE) - Normal    Narrative:     This  assay is used as an aid in the diagnosis of individuals suspected of having heart failure. It can be used as an aid in the diagnosis of acute decompensated heart failure (ADHF) in patients presenting with signs and symptoms of ADHF to the emergency department (ED). In addition, NT-proBNP of <300 pg/mL indicates ADHF is not likely.    Age Range Result Interpretation  NT-proBNP Concentration (pg/mL:      <50             Positive            >450                   Gray                 300-450                    Negative             <300    50-75           Positive            >900                  Gray                300-900                  Negative            <300      >75             Positive            >1800                  Gray                300-1800                  Negative            <300   TROPONIN - Normal    Narrative:     High Sensitive Troponin T Reference Range:  <14.0 ng/L- Negative Female for AMI  <22.0 ng/L- Negative Male for AMI  >=14 - Abnormal Female indicating possible myocardial injury.  >=22 - Abnormal Male indicating possible myocardial injury.   Clinicians would have to utilize clinical acumen, EKG, Troponin, and serial changes to determine if it is an Acute Myocardial Infarction or myocardial injury due to an underlying chronic condition.        RAINBOW DRAW    Narrative:     The following orders were created for panel order [place] Draw.  Procedure                               Abnormality         Status                     ---------                               -----------         ------                     Green Top (Gel)[[id number]]                                  Final result               Lavender Top[[id number]]                                     Final result               Gold Top - SST[[id number]]                                   Final result               Light Blue Top[[id number]]                                   Final result                 Please view results for these tests on  the individual orders.   COMPREHENSIVE METABOLIC PANEL    Narrative:     GFR Categories in Chronic Kidney Disease (CKD)              GFR Category          GFR (mL/min/1.73)    Interpretation  G1                    90 or greater        Normal or high (1)  G2                    60-89                Mild decrease (1)  G3a                   45-59                Mild to moderate decrease  G3b                   30-44                Moderate to severe decrease  G4                    15-29                Severe decrease  G5                    14 or less           Kidney failure    (1)In the absence of evidence of kidney disease, neither GFR category G1 or G2 fulfill the criteria for CKD.    eGFR calculation 2021 CKD-EPI creatinine equation, which does not include race as a factor   CBC AND DIFFERENTIAL    Narrative:     The following orders were created for panel order CBC & Differential.  Procedure                               Abnormality         Status                     ---------                               -----------         ------                     CBC Auto Differential[[id number]]        Abnormal            Final result                 Please view results for these tests on the individual orders.   GREEN TOP   LAVENDER TOP   GOLD TOP - SST   LIGHT BLUE TOP       EKG:   ECG 12 Lead ED Triage Standing Order; SOA   Preliminary Result   HEART RATE=89  bpm   RR Htwijpsj=701  ms   AK Koheqoct=472  ms   P Horizontal Axis=-2  deg   P Front Axis=24  deg   QRSD Interval=85  ms   QT Vsjsqjxu=733  ms   DSaC=988  ms   QRS Axis=11  deg   T Wave Axis=6  deg   - ABNORMAL ECG -   Ventricular-paced complexes   No further rhythm analysis attempted due to paced rhythm   Prolonged AK interval   No previous ECG available for comparison   Date and Time of Study:2025-07-07 20:07:31          Meds given in ED:   Medications   sodium chloride 0.9 % flush 10 mL (has no administration in time range)   Lidocaine 4 % 1 patch (has no  administration in time range)   iopamidol (ISOVUE-300) 61 % injection 85 mL (85 mL Intravenous Given by Other 25 6168)       Imaging results:  CT Chest With Contrast Diagnostic  Result Date: 2025   1. Potential nondisplaced fractures of the left lateral seventh and eighth ribs. 2. Patulous appearance to the duodenum and proximal jejunum. This may reflect enteritis and/or ileus.  Radiation dose reduction techniques were utilized, including automated exposure control and exposure modulation based on body size.   This report was finalized on 2025 11:00 PM by Dr. Yessenia Hensley M.D on Workstation: [id number]      CT Abdomen Pelvis With Contrast  Result Date: 2025   1. Potential nondisplaced fractures of the left lateral seventh and eighth ribs. 2. Patulous appearance to the duodenum and proximal jejunum. This may reflect enteritis and/or ileus.  Radiation dose reduction techniques were utilized, including automated exposure control and exposure modulation based on body size.   This report was finalized on 2025 11:00 PM by Dr. Yessenia Hensley M.D on Workstation: [id number]      XR Chest 1 View  Result Date: 2025  Bands of atelectasis or scarring in the lung bases    This report was finalized on 2025 8:51 PM by Dr. Nguyễn Marsh M.D on Workstation: [id number]        Ambulatory status:   - ad christiano    Social issues:   Social History     Socioeconomic History    Marital status:    Tobacco Use    Smoking status: Former     Current packs/day: 0.00     Average packs/day: 1 pack/day for 4.0 years (4.0 ttl pk-yrs)     Types: Cigarettes     Quit date: 1992     Years since quittin.5    Smokeless tobacco: Never   Vaping Use    Vaping status: Never Used   Substance and Sexual Activity    Alcohol use: Yes     Comment: occasionally    Drug use: No    Sexual activity: Yes     Partners: Female     Birth control/protection: Vasectomy, Surgical       Peripheral  Neurovascular  Peripheral Neurovascular (Adult)  Peripheral Neurovascular WDL: WDL    Neuro Cognitive  Neuro Cognitive (Adult)  Cognitive/Neuro/Behavioral WDL: WDL, level of consciousness, orientation  Level of Consciousness: Alert  Orientation: oriented x 4    Learning       Respiratory  Respiratory WDL  Respiratory WDL: .WDL except, rhythm/pattern, expansion/retractions  Rhythm/Pattern, Respiratory: no shortness of breath reported, shallow, pattern regular, unlabored  Expansion/Accessory Muscles/Retractions: expansion symmetric, no retractions, no use of accessory muscles    Abdominal Pain       Pain Assessments  Pain (Adult)  (0-10) Pain Rating: Rest: 4    NIH Stroke Scale       Ivone Durbin RN  07/08/25 00:25 EDT

## 2025-07-08 NOTE — CONSULTS
"[facility]   Consult Note    Patient Name: Zohaib Duran  : 1965  MRN: [id number]  Primary Care Physician:  María Orozco PA  Referring Physician: Shaquille Espinoza MD  Date of admission: 2025    Inpatient Gastroenterology Consult  Consult performed by: Partha Mathews MD  Consult ordered by: Saloni Castillo, BALJINDER        Subjective   Subjective     Reason for Consult/ Chief Complaint: \"ileus\"    History of Present Illness  Zohaib Duran is a 59 y.o. male patient presented after a fall with rib fractures. Imaging confirmed the fractures but showed distention of the jejunum and duodenum.  He has considerable gas, belching when eating fast or a large meal. No bleeding, diarrhea or swallowing issues. He reports egd and colonscopy years ago at [facility] where he had a colon polyp     Review of Systems   Cardiovascular:  Positive for chest pain.   Gastrointestinal:  Positive for abdominal pain.        Personal History     Past Medical History:   Diagnosis Date    Allergic     sulfa    Hip bursitis     Lumbar radiculopathy     Sleep apnea     C-Pap compliant       Past Surgical History:   Procedure Laterality Date    COLONOSCOPY      hemorrhoids    HERNIA REPAIR Left 10/27/2010    Open left inguinal hernia repair w/ mesh-Dr. Fito Bloom    TUMOR REMOVAL Left     thigh       Family History: family history includes Glaucoma in his mother; Kidney disease in an other family member. Otherwise pertinent FHx was reviewed and not pertinent to current issue.    Social History:  reports that he quit smoking about 33 years ago. His smoking use included cigarettes. He has a 4 pack-year smoking history. He has never used smokeless tobacco. He reports current alcohol use. He reports that he does not use drugs.    Home Medications:   vitamin C and vitamin D3    Allergies:  Allergies   Allergen Reactions    Sulfa Antibiotics Rash and Swelling       Objective    Objective     Vitals:  Temp:  [97.9 °F " (36.6 °C)-98.9 °F (37.2 °C)] 98.2 °F (36.8 °C)  Heart Rate:  [] 71  Resp:  [16-21] 20  BP: (116-158)/(74-96) 146/83    Physical Exam  HENT:      Right Ear: External ear normal.      Left Ear: External ear normal.      Mouth/Throat:      Pharynx: Oropharynx is clear.   Eyes:      Conjunctiva/sclera: Conjunctivae normal.   Cardiovascular:      Rate and Rhythm: Normal rate.   Pulmonary:      Effort: Pulmonary effort is normal.   Abdominal:      General: Abdomen is flat.   Skin:     General: Skin is warm.   Neurological:      General: No focal deficit present.      Mental Status: He is alert.   Psychiatric:         Mood and Affect: Mood normal.         Result Review    Result Review:  I have personally reviewed the results from the time of this admission to 7/8/2025 07:51 EDT and agree with these findings:  []  Laboratory list / accordion  []  Microbiology  []  Radiology  []  EKG/Telemetry   []  Cardiology/Vascular   []  Pathology  []  Old records  []  Other:  Most notable findings include:       Assessment & Plan   Assessment / Plan     Brief Patient Summary:  Zohaib Duran is a 59 y.o. male who   Abnormal imaging of the duodenum and jejunum. Maybe be artifactual doubt mass lesion  History of colon polyp     Active Hospital Problems:  Active Hospital Problems    Diagnosis     **Ileus      Plan: ugi and sbft today if ok can go home  Eventual upper tract endoscopy ( or enteroscopy depending on ugi,sbft) and colonoscopy as outpatient  Thanks   Consider       Partha Mathews MD

## 2025-07-08 NOTE — DISCHARGE SUMMARY
ED OBSERVATION PROGRESS/DISCHARGE SUMMARY    Date of Admission: 7/7/2025   LOS: 0 days   PCP: María Orozco PA    Final Diagnosis ***      Subjective     Hospital Outcome: ***    ROS:  General: no fevers, chills  Respiratory: no cough, dyspnea  Cardiovascular: no chest pain, palpitations  Abdomen: No abdominal pain, nausea, vomiting, or diarrhea  Neurologic: No focal weakness    Objective   Physical Exam:  I have reviewed the vital signs.  Temp:  [97.9 °F (36.6 °C)-98.9 °F (37.2 °C)] 98.2 °F (36.8 °C)  Heart Rate:  [] 71  Resp:  [16-21] 20  BP: (116-158)/(74-96) 146/83  General Appearance:    Alert, cooperative, no distress  Head:    Normocephalic, atraumatic  Eyes:    Sclerae anicteric  Neck:   Supple, no mass  Lungs: Clear to auscultation bilaterally, respirations unlabored  Heart: Regular rate and rhythm, S1 and S2 normal, no murmur, rub or gallop  Abdomen:  Soft, nontender, bowel sounds active, nondistended  Extremities: No clubbing, cyanosis, or edema to lower extremities  Pulses:  2+ and symmetric in distal lower extremities  Skin: No rashes   Neurologic: Oriented x3, Normal strength to extremities    Results Review:    I have reviewed the labs, radiology results and diagnostic studies.    Results from last 7 days   Lab Units 07/08/25  0330   WBC 10*3/mm3 8.15   HEMOGLOBIN g/dL 14.9   HEMATOCRIT % 44.4   PLATELETS 10*3/mm3 165     Results from last 7 days   Lab Units 07/08/25  0330 07/07/25 2008   SODIUM mmol/L 138 142   POTASSIUM mmol/L 4.0 4.3   CHLORIDE mmol/L 104 104   CO2 mmol/L 21.0* 25.2   BUN mg/dL 13.0 14.0   CREATININE mg/dL 0.85 1.03   CALCIUM mg/dL 8.8 9.6   BILIRUBIN mg/dL 1.2 1.2   ALK PHOS U/L 46 51   ALT (SGPT) U/L 23 25   AST (SGOT) U/L 21 18   GLUCOSE mg/dL 92 86     Imaging Results (Last 24 Hours)       Procedure Component Value Units Date/Time    CT Chest With Contrast Diagnostic [[id number]] Collected: 07/07/25 2252     Updated: 07/07/25 2305    Narrative:      CT OF THE CHEST  WITH CONTRAST; CT OF THE ABDOMEN PELVIS WITH CONTRAST     HISTORY: Fall     COMPARISON: None available.     TECHNIQUE: Axial CT imaging was obtained from the thoracic inlet through  the symphysis pubis. IV contrast was administered.     FINDINGS:  Chest: There may be nondisplaced fractures of the left lateral seventh  and eighth ribs. No thoracic vertebral fractures are seen. No  pneumothorax is seen. The patient does have atelectasis within both  lower lobes, left greater than right. The thyroid gland, trachea, and  esophagus appear unremarkable. Thoracic aorta is normal in caliber. No  dissection is seen. No coronary artery calcifications are identified.  Mediastinal lymph nodes do not appear pathologically enlarged.     Abdomen/pelvis: No fractures are seen. No focal hepatic lesions are  seen. The stomach adrenal glands, spleen, pancreas, and gallbladder are  normal. The kidneys enhance symmetrically. No hydronephrosis is seen.  The patient's duodenum and proximal jejunum appears somewhat patulous  and fluid-filled. Mild enteritis and/or ileus is not excluded the  kidneys enhance symmetrically. No hydronephrosis is seen. Prostate gland  contains dystrophic calcifications. Urinary bladder is normal. Abdominal  aorta is normal in caliber, and without evidence of obstruction. There  is bowel obstruction. The appendix is normal.       Impression:         1. Potential nondisplaced fractures of the left lateral seventh and  eighth ribs.  2. Patulous appearance to the duodenum and proximal jejunum. This may  reflect enteritis and/or ileus.     Radiation dose reduction techniques were utilized, including automated  exposure control and exposure modulation based on body size.        This report was finalized on 7/7/2025 11:00 PM by Dr. Yessenia Hensley M.D on Workstation: [id number]       CT Abdomen Pelvis With Contrast [[id number]] Collected: 07/07/25 2252     Updated: 07/07/25 2303    Narrative:      CT OF THE CHEST  WITH CONTRAST; CT OF THE ABDOMEN PELVIS WITH CONTRAST     HISTORY: Fall     COMPARISON: None available.     TECHNIQUE: Axial CT imaging was obtained from the thoracic inlet through  the symphysis pubis. IV contrast was administered.     FINDINGS:  Chest: There may be nondisplaced fractures of the left lateral seventh  and eighth ribs. No thoracic vertebral fractures are seen. No  pneumothorax is seen. The patient does have atelectasis within both  lower lobes, left greater than right. The thyroid gland, trachea, and  esophagus appear unremarkable. Thoracic aorta is normal in caliber. No  dissection is seen. No coronary artery calcifications are identified.  Mediastinal lymph nodes do not appear pathologically enlarged.     Abdomen/pelvis: No fractures are seen. No focal hepatic lesions are  seen. The stomach adrenal glands, spleen, pancreas, and gallbladder are  normal. The kidneys enhance symmetrically. No hydronephrosis is seen.  The patient's duodenum and proximal jejunum appears somewhat patulous  and fluid-filled. Mild enteritis and/or ileus is not excluded the  kidneys enhance symmetrically. No hydronephrosis is seen. Prostate gland  contains dystrophic calcifications. Urinary bladder is normal. Abdominal  aorta is normal in caliber, and without evidence of obstruction. There  is bowel obstruction. The appendix is normal.       Impression:         1. Potential nondisplaced fractures of the left lateral seventh and  eighth ribs.  2. Patulous appearance to the duodenum and proximal jejunum. This may  reflect enteritis and/or ileus.     Radiation dose reduction techniques were utilized, including automated  exposure control and exposure modulation based on body size.        This report was finalized on 7/7/2025 11:00 PM by Dr. Yessenia Hensley M.D on Workstation: [id number]       XR Chest 1 View [[id number]] Collected: 07/07/25 2050     Updated: 07/07/25 2054    Narrative:      AP CHEST     HISTORY: Shortness  of breath     COMPARISON: None available     FINDINGS: There are bands of atelectasis or scarring in the lung bases.  Heart size normal. No evidence of pneumothorax. No acute bony  abnormality       Impression:      Bands of atelectasis or scarring in the lung bases           This report was finalized on 7/7/2025 8:51 PM by Dr. Nguyễn Marsh M.D  on Workstation: [id number]               I have reviewed the medications.     Discharge Medications        ASK your doctor about these medications        Instructions Start Date   vitamin C 250 MG tablet  Commonly known as: ASCORBIC ACID   250 mg, Oral, Daily      vitamin D3 125 MCG (5000 UT) capsule capsule   5,000 Units, Daily              ---------------------------------------------------------------------------------------------  Assessment & Plan   Assessment/Problem List    Ileus    Plan:    Acute ileus  - CT abdomen shows patulous appearance to the duodenum and proximal known small jejunum  - Analgesia antiemetics as needed  - N.p.o.  - IV fluid hydration   - GI consultation     Left rib fractures  Mechanical fall  - CT imaging shows potential nondisplaced fractures of the left lateral 7th and 8th ribs  - Pain controlled at this time, lidocaine patch placed  - Analgesia as needed  - Patient ambulatory without difficulty     Sleep apnea  - Nocturnal O2 as needed        VTE Prophylaxis:  Mechanical VTE prophylaxis orders are present.    Disposition: ***    Follow-up after Discharge: ***    This note will serve as a {OBS_DispoNotes:[id number]}    Siria Batres, APRN 07/08/25 07:57 EDT    I have worn appropriate PPE during this patient encounter, sanitized my hands both with entering and exiting patient's room.      *** minutes has been spent by [facility] providers in the care of this patient while under observation status on this date 07/08/25

## 2025-07-08 NOTE — H&P
[facility]   HISTORY AND PHYSICAL    Patient Name: Zohaib Duran  : 1965  MRN: [id number]  Primary Care Physician:  María Orozco PA  Date of admission: 2025    Patient Care Team:  María Orozco PA as PCP - General (Family Medicine)       Subjective   Subjective     Chief Complaint:   Chief Complaint   Patient presents with    Rib Pain    Shoulder Pain    Fall         HPI:    Zohaib Duran is a 59 y.o. male with a past medical history of dyslipidemia and sleep apnea who presents to [facility] ER with left rib and abdominal pain.  Patient states several days ago he slipped on some mud and fell onto a bucket on his left side.  He is report pain in the rib area as well as his right shoulder since the fall.  He states pain gets worse when he moves.  Denies any numbness or tingling.  He reports past couple of days he has had abdominal pain in the left side of the abdomen worse with eating.  He is also reported some bloating as well as nausea and vomiting.  He denies any diarrhea.  He reports his last bowel movement was yesterday.  Denies fevers and chills.  Denies chest pain shortness of breath.  Denies lower extremity swelling.  Denies pain with urination or change in frequency.  Denies known sick contacts.    Review of Systems   All systems were reviewed and negative except for: What is mentioned above in the HPI.    Personal History     Past Medical History:   Diagnosis Date    Allergic     sulfa    Hip bursitis     Lumbar radiculopathy     Sleep apnea     C-Pap compliant       Past Surgical History:   Procedure Laterality Date    COLONOSCOPY      hemorrhoids    HERNIA REPAIR Left 10/27/2010    Open left inguinal hernia repair w/ mesh-Dr. Fito Bloom    TUMOR REMOVAL Left     thigh       Family History: family history includes Glaucoma in his mother; Kidney disease in an other family member. Otherwise pertinent FHx was reviewed and not pertinent to current  issue.    Social History:  reports that he quit smoking about 33 years ago. His smoking use included cigarettes. He has a 4 pack-year smoking history. He has never used smokeless tobacco. He reports current alcohol use. He reports that he does not use drugs.    Home Medications:       Allergies:  Allergies   Allergen Reactions    Sulfa Antibiotics Rash and Swelling       Objective   Objective     Vitals:   Temp:  [98.9 °F (37.2 °C)] 98.9 °F (37.2 °C)  Heart Rate:  [] 85  Resp:  [20-21] 21  BP: (119-158)/(88-96) 132/91  Physical Exam    Constitutional: 59-year-old male in no acute distress on room air   Eyes: PERRLA, sclerae anicteric, no conjunctival injection   HENT: NCAT, mucous membranes moist   Neck: Supple, no thyromegaly, no lymphadenopathy, trachea midline   Respiratory: Clear to auscultation bilaterally, nonlabored respirations    Cardiovascular: RRR, no murmurs, rubs, or gallops, palpable pedal pulses bilaterally   Gastrointestinal: Positive bowel sounds, soft, tenderness in the left upper quadrant, left flank tenderness, nondistended   Musculoskeletal: No bilateral ankle edema, no clubbing or cyanosis to extremities   Psychiatric: Appropriate affect, cooperative   Neurologic: Oriented x 3, strength symmetric in all extremities, Cranial Nerves grossly intact to confrontation, speech clear   Skin: No rashes     Result Review    Result Review:  I have personally reviewed the results from the time of this admission to 7/8/2025 00:40 EDT and agree with these findings:  [x]  Laboratory list / accordion  []  Microbiology  [x]  Radiology  [x]  EKG/Telemetry   []  Cardiology/Vascular   []  Pathology  [x]  Old records  []  Other:  Most notable findings include:     CT Chest With Contrast Diagnostic  Result Date: 7/7/2025  CT OF THE CHEST WITH CONTRAST; CT OF THE ABDOMEN PELVIS WITH CONTRAST  HISTORY: Fall  COMPARISON: None available.  TECHNIQUE: Axial CT imaging was obtained from the thoracic inlet through the  symphysis pubis. IV contrast was administered.  FINDINGS: Chest: There may be nondisplaced fractures of the left lateral seventh and eighth ribs. No thoracic vertebral fractures are seen. No pneumothorax is seen. The patient does have atelectasis within both lower lobes, left greater than right. The thyroid gland, trachea, and esophagus appear unremarkable. Thoracic aorta is normal in caliber. No dissection is seen. No coronary artery calcifications are identified. Mediastinal lymph nodes do not appear pathologically enlarged.  Abdomen/pelvis: No fractures are seen. No focal hepatic lesions are seen. The stomach adrenal glands, spleen, pancreas, and gallbladder are normal. The kidneys enhance symmetrically. No hydronephrosis is seen. The patient's duodenum and proximal jejunum appears somewhat patulous and fluid-filled. Mild enteritis and/or ileus is not excluded the kidneys enhance symmetrically. No hydronephrosis is seen. Prostate gland contains dystrophic calcifications. Urinary bladder is normal. Abdominal aorta is normal in caliber, and without evidence of obstruction. There is bowel obstruction. The appendix is normal.       1. Potential nondisplaced fractures of the left lateral seventh and eighth ribs. 2. Patulous appearance to the duodenum and proximal jejunum. This may reflect enteritis and/or ileus.  Radiation dose reduction techniques were utilized, including automated exposure control and exposure modulation based on body size.   This report was finalized on 7/7/2025 11:00 PM by Dr. Yessenia Hensley M.D on Workstation: [id number]      CT Abdomen Pelvis With Contrast  Result Date: 7/7/2025  CT OF THE CHEST WITH CONTRAST; CT OF THE ABDOMEN PELVIS WITH CONTRAST  HISTORY: Fall  COMPARISON: None available.  TECHNIQUE: Axial CT imaging was obtained from the thoracic inlet through the symphysis pubis. IV contrast was administered.  FINDINGS: Chest: There may be nondisplaced fractures of the left lateral  seventh and eighth ribs. No thoracic vertebral fractures are seen. No pneumothorax is seen. The patient does have atelectasis within both lower lobes, left greater than right. The thyroid gland, trachea, and esophagus appear unremarkable. Thoracic aorta is normal in caliber. No dissection is seen. No coronary artery calcifications are identified. Mediastinal lymph nodes do not appear pathologically enlarged.  Abdomen/pelvis: No fractures are seen. No focal hepatic lesions are seen. The stomach adrenal glands, spleen, pancreas, and gallbladder are normal. The kidneys enhance symmetrically. No hydronephrosis is seen. The patient's duodenum and proximal jejunum appears somewhat patulous and fluid-filled. Mild enteritis and/or ileus is not excluded the kidneys enhance symmetrically. No hydronephrosis is seen. Prostate gland contains dystrophic calcifications. Urinary bladder is normal. Abdominal aorta is normal in caliber, and without evidence of obstruction. There is bowel obstruction. The appendix is normal.       1. Potential nondisplaced fractures of the left lateral seventh and eighth ribs. 2. Patulous appearance to the duodenum and proximal jejunum. This may reflect enteritis and/or ileus.  Radiation dose reduction techniques were utilized, including automated exposure control and exposure modulation based on body size.   This report was finalized on 7/7/2025 11:00 PM by Dr. Yessenia Hensley M.D on Workstation: [id number]      XR Chest 1 View  Result Date: 7/7/2025  AP CHEST  HISTORY: Shortness of breath  COMPARISON: None available  FINDINGS: There are bands of atelectasis or scarring in the lung bases. Heart size normal. No evidence of pneumothorax. No acute bony abnormality      Bands of atelectasis or scarring in the lung bases    This report was finalized on 7/7/2025 8:51 PM by Dr. Nguyễn Marsh M.D on Workstation: [id number]            The 10-year ASCVD risk score (Horace DK, et al., 2019) is: 7.7%     Values used to calculate the score:      Age: 59 years      Sex: Male      Is Non- : No      Diabetic: No      Tobacco smoker: No      Systolic Blood Pressure: 132 mmHg      Is BP treated: No      HDL Cholesterol: 35 mg/dL      Total Cholesterol: 166 mg/dL     Assessment & Plan   Assessment / Plan     Brief Patient Summary:  Zohaib Duran is a 59 y.o. male who is being admitted the observation unit with acute ileus.  Patient had a fall several days ago landing on his left side.  In the ER, CT chest shows potential nondisplaced fractures of the left lateral 7th and 8th ribs.  A CT of the abdomen and pelvis reveals patulous appearance to the duodenum and proximal jejunum that may reflect enteritis or ileus.  Lab work fairly unremarkable.  EKG shows sinus rhythm, no acute ischemia.  Plan for IV fluid hydration and supportive treatment overnight.  GI consulted.    Active Hospital Problems:  Active Hospital Problems    Diagnosis     **Ileus      Plan:     Acute ileus  - CT abdomen shows patulous appearance to the duodenum and proximal known small jejunum  - Analgesia antiemetics as needed  - N.p.o.  - IV fluid hydration   - GI consultation    Left rib fractures  Mechanical fall  - CT imaging shows potential nondisplaced fractures of the left lateral 7th and 8th ribs  - Pain controlled at this time, lidocaine patch placed  - Analgesia as needed  - Patient ambulatory without difficulty    Sleep apnea  - Nocturnal O2 as needed      VTE Prophylaxis:  Mechanical VTE prophylaxis orders are present.        CODE STATUS:    Code Status (Patient has no pulse and is not breathing): CPR (Attempt to Resuscitate)  Medical Interventions (Patient has pulse or is breathing): Full Support  Level Of Support Discussed With: Patient    Admission Status:  I believe this patient meets observation status.    75 minutes have been spent by [facility] providers in the care of this patient  while under observation status on 07/08/25 .      Appropriate PPE worn during patient encounter.  Hand hygeine performed before and after seeing the patient.      Electronically signed by Saloni Castillo PA-C, 07/08/25, 12:40 AM EDT.

## 2025-07-09 ENCOUNTER — READMISSION MANAGEMENT (OUTPATIENT)
Dept: CALL CENTER | Facility: HOSPITAL | Age: 60
End: 2025-07-09
Payer: COMMERCIAL

## 2025-07-09 VITALS
HEART RATE: 85 BPM | OXYGEN SATURATION: 91 % | DIASTOLIC BLOOD PRESSURE: 85 MMHG | HEIGHT: 72 IN | BODY MASS INDEX: 35.91 KG/M2 | TEMPERATURE: 98.6 F | RESPIRATION RATE: 18 BRPM | WEIGHT: 265.1 LBS | SYSTOLIC BLOOD PRESSURE: 127 MMHG

## 2025-07-09 LAB
ALBUMIN SERPL-MCNC: 4 G/DL (ref 3.5–5.2)
ALBUMIN/GLOB SERPL: 1.3 G/DL
ALP SERPL-CCNC: 51 U/L (ref 39–117)
ALT SERPL W P-5'-P-CCNC: 22 U/L (ref 1–41)
ANION GAP SERPL CALCULATED.3IONS-SCNC: 12.7 MMOL/L (ref 5–15)
AST SERPL-CCNC: 18 U/L (ref 1–40)
BASOPHILS # BLD AUTO: 0.04 10*3/MM3 (ref 0–0.2)
BASOPHILS NFR BLD AUTO: 0.5 % (ref 0–1.5)
BILIRUB SERPL-MCNC: 1.1 MG/DL (ref 0–1.2)
BUN SERPL-MCNC: 12 MG/DL (ref 6–20)
BUN/CREAT SERPL: 15.6 (ref 7–25)
CALCIUM SPEC-SCNC: 8.8 MG/DL (ref 8.6–10.5)
CHLORIDE SERPL-SCNC: 103 MMOL/L (ref 98–107)
CO2 SERPL-SCNC: 21.3 MMOL/L (ref 22–29)
CREAT SERPL-MCNC: 0.77 MG/DL (ref 0.76–1.27)
DEPRECATED RDW RBC AUTO: 42.7 FL (ref 37–54)
EGFRCR SERPLBLD CKD-EPI 2021: 104.4 ML/MIN/1.73
EOSINOPHIL # BLD AUTO: 0.31 10*3/MM3 (ref 0–0.4)
EOSINOPHIL NFR BLD AUTO: 3.6 % (ref 0.3–6.2)
ERYTHROCYTE [DISTWIDTH] IN BLOOD BY AUTOMATED COUNT: 12.3 % (ref 12.3–15.4)
GLOBULIN UR ELPH-MCNC: 3 GM/DL
GLUCOSE SERPL-MCNC: 89 MG/DL (ref 65–99)
HCT VFR BLD AUTO: 44.8 % (ref 37.5–51)
HGB BLD-MCNC: 15.5 G/DL (ref 13–17.7)
IMM GRANULOCYTES # BLD AUTO: 0.04 10*3/MM3 (ref 0–0.05)
IMM GRANULOCYTES NFR BLD AUTO: 0.5 % (ref 0–0.5)
LYMPHOCYTES # BLD AUTO: 1.49 10*3/MM3 (ref 0.7–3.1)
LYMPHOCYTES NFR BLD AUTO: 17.3 % (ref 19.6–45.3)
MCH RBC QN AUTO: 32.4 PG (ref 26.6–33)
MCHC RBC AUTO-ENTMCNC: 34.6 G/DL (ref 31.5–35.7)
MCV RBC AUTO: 93.7 FL (ref 79–97)
MONOCYTES # BLD AUTO: 0.9 10*3/MM3 (ref 0.1–0.9)
MONOCYTES NFR BLD AUTO: 10.5 % (ref 5–12)
NEUTROPHILS NFR BLD AUTO: 5.82 10*3/MM3 (ref 1.7–7)
NEUTROPHILS NFR BLD AUTO: 67.6 % (ref 42.7–76)
NRBC BLD AUTO-RTO: 0 /100 WBC (ref 0–0.2)
PLATELET # BLD AUTO: 172 10*3/MM3 (ref 140–450)
PMV BLD AUTO: 9.3 FL (ref 6–12)
POTASSIUM SERPL-SCNC: 3.9 MMOL/L (ref 3.5–5.2)
PROT SERPL-MCNC: 7 G/DL (ref 6–8.5)
QT INTERVAL: 336 MS
QTC INTERVAL: 409 MS
RBC # BLD AUTO: 4.78 10*6/MM3 (ref 4.14–5.8)
SODIUM SERPL-SCNC: 137 MMOL/L (ref 136–145)
WBC NRBC COR # BLD AUTO: 8.6 10*3/MM3 (ref 3.4–10.8)

## 2025-07-09 PROCEDURE — 85025 COMPLETE CBC W/AUTO DIFF WBC: CPT | Performed by: NURSE PRACTITIONER

## 2025-07-09 PROCEDURE — 80053 COMPREHEN METABOLIC PANEL: CPT | Performed by: NURSE PRACTITIONER

## 2025-07-09 PROCEDURE — G0378 HOSPITAL OBSERVATION PER HR: HCPCS

## 2025-07-09 RX ORDER — HYOSCYAMINE SULFATE 0.12 MG/1
125 TABLET SUBLINGUAL
Qty: 90 TABLET | Refills: 0 | Status: SHIPPED | OUTPATIENT
Start: 2025-07-09

## 2025-07-09 RX ORDER — FAMOTIDINE 20 MG/1
40 TABLET, FILM COATED ORAL
Status: DISCONTINUED | OUTPATIENT
Start: 2025-07-09 | End: 2025-07-09 | Stop reason: HOSPADM

## 2025-07-09 RX ORDER — FAMOTIDINE 40 MG/1
40 TABLET, FILM COATED ORAL
Qty: 60 TABLET | Refills: 1 | Status: SHIPPED | OUTPATIENT
Start: 2025-07-09

## 2025-07-09 RX ORDER — LIDOCAINE 4 G/G
1 PATCH TOPICAL
Qty: 30 EACH | Refills: 1 | Status: SHIPPED | OUTPATIENT
Start: 2025-07-10

## 2025-07-09 RX ORDER — HYOSCYAMINE SULFATE 0.12 MG/1
125 TABLET SUBLINGUAL
Status: DISCONTINUED | OUTPATIENT
Start: 2025-07-09 | End: 2025-07-09 | Stop reason: HOSPADM

## 2025-07-09 RX ADMIN — HYOSCYAMINE SULFATE 125 MCG: 0.12 TABLET SUBLINGUAL at 11:04

## 2025-07-09 RX ADMIN — FAMOTIDINE 40 MG: 20 TABLET, FILM COATED ORAL at 08:17

## 2025-07-09 RX ADMIN — LIDOCAINE 1 PATCH: 4 PATCH TOPICAL at 08:22

## 2025-07-09 NOTE — DISCHARGE SUMMARY
ED OBSERVATION PROGRESS/DISCHARGE SUMMARY    Date of Admission: 7/7/2025   LOS: 0 days   PCP: María Orozco PA    Final Diagnosis ileus      Subjective     Hospital Outcome:   Zohaib Duran is a 59 y.o. male with a past medical history of dyslipidemia and sleep apnea who was admitted to the ED observation unit with left rib and abdominal pain.  Patient stated several days ago he slipped on some mud and fell onto a bucket on his left side.  He reported pain in the rib area as well as his right shoulder that hurts worse when he moves as well as abdominal pain in the left side of the abdomen worse with eating.  Labs were grossly unremarkable. CT Chest reported nondisplaced fractures of the left lateral seventh and eighth ribs. CT Scan of the abdomen/pelvis reported patulous appearance to the duodenum and proximal jejunum that may reflect enteritis and/or ileus. Patient was seen by GI in consultation. SBFT was performed and reported a small diverticulum, gastroesophageal reflux, nonspecific fold thickening in the distal duodenum and proximal jejunum.     7/9/25:  Patient tolerating diet this morning, seen and evaluated by gastroenterology service who recommend Levsin and Pepcid.    This afternoon patient is tolerating his lunch without any difficulties, states that his pain is nearly resolved and he has a good appetite.  Patient reports he is anxious for discharge home, agreeable to follow-up with GI if his pain returns or worsens, particularly for gallbladder workup if pain returns with eating foods with high fat content.        ROS:  General: no fevers, chills  Respiratory: no cough, dyspnea  Cardiovascular: no chest pain, palpitations  Abdomen: No abdominal pain, nausea, vomiting, or diarrhea  Neurologic: No focal weakness    Objective   Physical Exam:  I have reviewed the vital signs.  Temp:  [97.9 °F (36.6 °C)-98.6 °F (37 °C)] 97.9 °F (36.6 °C)  Heart Rate:  [76-97] 76  Resp:  [16-18] 18  BP: (107-131)/(75-84)  122/75  General Appearance:    Alert, cooperative, no distress  Head:    Normocephalic, atraumatic  Eyes:    Sclerae anicteric  Neck:   Supple, no mass  Lungs: Clear to auscultation bilaterally, respirations unlabored  Heart: Regular rate and rhythm, S1 and S2 normal, no murmur, rub or gallop  Abdomen:  Soft, nontender, bowel sounds active, nondistended, no rebound or guarding  Extremities: No clubbing, cyanosis, or edema to lower extremities  Pulses:  2+ and symmetric in distal lower extremities  Skin: No rashes   Neurologic: Oriented x3, Normal strength to extremities    Results Review:    I have reviewed the labs, radiology results and diagnostic studies.    Results from last 7 days   Lab Units 07/08/25  0330   WBC 10*3/mm3 8.15   HEMOGLOBIN g/dL 14.9   HEMATOCRIT % 44.4   PLATELETS 10*3/mm3 165     Results from last 7 days   Lab Units 07/08/25  0330 07/07/25 2008   SODIUM mmol/L 138 142   POTASSIUM mmol/L 4.0 4.3   CHLORIDE mmol/L 104 104   CO2 mmol/L 21.0* 25.2   BUN mg/dL 13.0 14.0   CREATININE mg/dL 0.85 1.03   CALCIUM mg/dL 8.8 9.6   BILIRUBIN mg/dL 1.2 1.2   ALK PHOS U/L 46 51   ALT (SGPT) U/L 23 25   AST (SGOT) U/L 21 18   GLUCOSE mg/dL 92 86     Imaging Results (Last 24 Hours)       Procedure Component Value Units Date/Time    FL Upper GI Single Contrast SBFT - Preliminary [[id number]] Collected: 07/08/25 1651     Updated: 07/08/25 1652    This result has not been signed. Information might be incomplete.      Narrative:      EXAMINATION: UPPER GASTROINTESTINAL DOUBLE CONTRAST AND SMALL BOWEL  FOLLOW THROUGH UNDER FLUOROSCOPY.     DATE: 7/8/2025     COMPARISON: CT abdomen and pelvis 7/7/2025     CLINICAL HISTORY: 59-year-old male with abdominal pain, multiple rib  fractures. Abnormal CT abdomen and pelvis with patulous fluid-filled  duodenum and proximal jejunum which may reflect enteritis and/or ileus.     TOTAL NUMBER OF FLUOROSCOPIC IMAGES: 191  DOSE AREA PRODUCT: 7107.5 uGym2      DETAILS:  Administration of thin barium, thick barium, and air crystals were  provided to the patient. Rapid sequence video clips and spot films of  the upper gastrointestinal tract were obtained by LUZ Mcqueen.     FINDINGS:  The preliminary  views of the abdomen show nonspecific bowel gas  pattern.     Esophagus:  Best viewed in the AP and right oblique, there is a tiny focal right  lateral outpouching along the cervical esophagus with retained contrast  after the swallow which could represent a small  diverticulum. Normal  esophageal peristalsis observed.  The esophageal mucosa is normal  without evidence for a filling defect, ulceration, stricture or  significant narrowing. No evidence of hiatal hernia is identified. Trace  volume of recumbent gastroesophageal reflux is observed.     Stomach:  Barium flows readily through the esophagus and into the stomach. The  stomach distends in a normal fashion and demonstrates a normal mucosal,  with no ulceration or erosion. No extrinsic mass compression is evident.  The contrast passed freely through the stomach with no evidence of  gastroparesis.       Duodenum:  Mild fold thickening within the third and fourth portion of the  duodenum.     Small bowel:  Multiple overhead images of the small intestine were obtained as per  protocol. Contrast was seen reaching the cecum by approximately 90  minutes. The proximal jejunal folds are thickened. The remainder of the  small bowel mucosa is within normal limits with no evidence of filling  defect or other gross abnormality. There is no evidence of stricture or  obstruction. Spot film of the terminal ileum shows no gross mucosal  abnormality.        Impression:      1. Tiny focal right lateral outpouching along the cervical esophagus  with retained contrast after the swallow which most likely represents a  small diverticulum.  2. Trace volume of recumbent gastroesophageal reflux.  3. Nonspecific fold thickening in  the distal duodenum and proximal  jejunum. These findings correlate with CT abdomen pelvis 7/7/2025.  4. Normal small bowel transit with contrast seen reaching the cecum at  approximately 90 minutes. The terminal ileum is normal. No bowel  obstruction.                           I have reviewed the medications.     Discharge Medications        ASK your doctor about these medications        Instructions Start Date   vitamin C 250 MG tablet  Commonly known as: ASCORBIC ACID   250 mg, Oral, Daily      vitamin D3 125 MCG (5000 UT) capsule capsule   5,000 Units, Daily              ---------------------------------------------------------------------------------------------  Assessment & Plan   Assessment/Problem List    Ileus      Plan:    Acute ileus  - CT abdomen shows patulous appearance to the duodenum and proximal known small jejunum  -SBFT was performed and reported a small diverticulum, gastroesophageal reflux, nonspecific fold thickening in the distal duodenum and proximal jejunum.   - GI consult, see MD note     Left rib fractures  Mechanical fall  - CT imaging shows potential nondisplaced fractures of the left lateral 7th and 8th ribs  - Pain controlled at this time, lidocaine patch placed     Sleep apnea  - Nocturnal O2 as needed       Disposition: Home    Follow-up after Discharge: PCP and GI    This note will serve as a discharge summary    Sakina Marsh, APRN 07/09/25 04:13 EDT    I have worn appropriate PPE during this patient encounter, sanitized my hands both with entering and exiting patient's room.      39 minutes has been spent by [facility] providers in the care of this patient while under observation status on this date 07/09/25

## 2025-07-09 NOTE — PROGRESS NOTES
[facility]     Progress Note    Patient Name: Zohaib Duran  : 1965  MRN: [id number]  Primary Care Physician:  María Orozco PA  Date of admission: 2025    Subjective   Subjective     Chief Complaint: abdominal discomfort     History of Present Illness  Patient Reports feel ok today. No black or bloody stools. Tolerated ugi sbft.  His wife mention cramping and belching with meals some right sided pain ( points to lower abd)     Review of Systems   Gastrointestinal:  Positive for abdominal pain.       Objective   Objective     Vitals:   Temp:  [97.9 °F (36.6 °C)-98.6 °F (37 °C)] 98.4 °F (36.9 °C)  Heart Rate:  [76-97] 85  Resp:  [16-18] 18  BP: (107-131)/(75-88) 125/88    Physical Exam  HENT:      Right Ear: External ear normal.      Left Ear: External ear normal.      Mouth/Throat:      Pharynx: Oropharynx is clear.   Eyes:      Conjunctiva/sclera: Conjunctivae normal.   Cardiovascular:      Rate and Rhythm: Normal rate.   Abdominal:      Tenderness: There is abdominal tenderness in the right lower quadrant. There is no guarding or rebound.   Skin:     General: Skin is warm and dry.   Neurological:      General: No focal deficit present.      Mental Status: He is alert.          Result Review    Result Review:  I have personally reviewed the results from the time of this admission to 2025 08:00 EDT and agree with these findings:  []  Laboratory list / accordion  []  Microbiology  []  Radiology  []  EKG/Telemetry   []  Cardiology/Vascular   []  Pathology  []  Old records  []  Other:  Most notable findings include:       Assessment & Plan   Assessment / Plan     Brief Patient Summary:  Zohaib Duran is a 59 y.o. male who   Abdominal pain may be functional  Thickened duodenal folds may be inflammatory     Active Hospital Problems:  Active Hospital Problems    Diagnosis     **Ileus      Plan: pepcid 40 bid  Levsin with meals  If tolerates meal could go home. If not would get hida scan with  cck  Plan outpatient upper and lower tract scope       VTE Prophylaxis:  Mechanical VTE prophylaxis orders are present.        CODE STATUS:    Code Status (Patient has no pulse and is not breathing): CPR (Attempt to Resuscitate)  Medical Interventions (Patient has pulse or is breathing): Full Support  Level Of Support Discussed With: Patient    Disposition:  I expect patient to be discharged soon .    Partha Mathews MD

## 2025-07-09 NOTE — PLAN OF CARE
Goal Outcome Evaluation:         Pt d/c'd per provider order. Pt is A&Ox4, VSS, respirations regular, and in NAD upon discharge. Pt educated on medication administration, potential side-effects, and provided with information regarding follow-up care. Pt verbalized understanding. Pt w/ no further questions at this time. All belongings sent with patient.

## 2025-07-09 NOTE — PLAN OF CARE
Goal Outcome Evaluation:    Patient admitted for ileus.  Patient states that whenever he eats he has abdominal pain that radiates to his back.  Patient sleeping between care.  GI consult.

## 2025-07-09 NOTE — OUTREACH NOTE
Prep Survey      Flowsheet Row Responses   [facility] patient discharged from? [place]   Is LACE score < 7 ? Yes   Eligibility [facility]   Date of Admission 07/07/25   Date of Discharge 07/09/25   Discharge Disposition Home or Self Care   Discharge diagnosis Ileus   Does the patient have one of the following disease processes/diagnoses(primary or secondary)? Other   Does the patient have Home health ordered? No   Is there a DME ordered? No   Prep survey completed? Yes            RENETTA VEGA - Registered Nurse

## 2025-07-10 ENCOUNTER — TRANSITIONAL CARE MANAGEMENT TELEPHONE ENCOUNTER (OUTPATIENT)
Dept: CALL CENTER | Facility: HOSPITAL | Age: 60
End: 2025-07-10
Payer: COMMERCIAL

## 2025-07-10 NOTE — OUTREACH NOTE
Call Center TCM Note      Flowsheet Row Responses   [facility] patient discharged from? [place]   Does the patient have one of the following disease processes/diagnoses(primary or secondary)? Other   TCM attempt successful? Yes   Call start time 1042   Call end time 1043   Discharge diagnosis Ileus   Meds reviewed with patient/caregiver? Yes   Is the patient having any side effects they believe may be caused by any medication additions or changes? No   Does the patient have all medications ordered at discharge? Yes   Is the patient taking all medications as directed (includes completed medication regime)? Yes   Does the patient have an appointment with their PCP within 7-14 days of discharge? No   Nursing Interventions Patient declined scheduling/rescheduling appointment at this time   Has home health visited the patient within 72 hours of discharge? N/A   Psychosocial issues? No   Did the patient receive a copy of their discharge instructions? Yes   Nursing interventions Reviewed instructions with patient   What is the patient's perception of their health status since discharge? Improving   TCM call completed? Yes   Call end time 1043   Would this patient benefit from a Referral to [facility] Social Work? No   Is the patient interested in additional calls from an ambulatory ? No            María NOONAN - Registered Nurse    7/10/2025, 10:44 EDT

## 2025-07-10 NOTE — PROGRESS NOTES
I wanted to make you aware that patient declined scheduling a hospital follow up at the time of call.

## 2025-07-11 NOTE — PROGRESS NOTES
Please ensure he is aware he is due for his 3-month follow-up with me as well.  We can schedule this as a hospital follow-up but needs to be seen for 3-month follow-up.